# Patient Record
Sex: FEMALE | Race: WHITE | NOT HISPANIC OR LATINO | Employment: FULL TIME | URBAN - NONMETROPOLITAN AREA
[De-identification: names, ages, dates, MRNs, and addresses within clinical notes are randomized per-mention and may not be internally consistent; named-entity substitution may affect disease eponyms.]

---

## 2023-08-25 ENCOUNTER — OFFICE VISIT (OUTPATIENT)
Dept: URGENT CARE | Facility: PHYSICIAN GROUP | Age: 48
End: 2023-08-25

## 2023-08-25 VITALS
TEMPERATURE: 97.5 F | WEIGHT: 112 LBS | RESPIRATION RATE: 16 BRPM | OXYGEN SATURATION: 95 % | SYSTOLIC BLOOD PRESSURE: 130 MMHG | HEIGHT: 60 IN | HEART RATE: 96 BPM | DIASTOLIC BLOOD PRESSURE: 82 MMHG | BODY MASS INDEX: 21.99 KG/M2

## 2023-08-25 DIAGNOSIS — L50.9 URTICARIA: ICD-10-CM

## 2023-08-25 PROCEDURE — 3075F SYST BP GE 130 - 139MM HG: CPT | Performed by: FAMILY MEDICINE

## 2023-08-25 PROCEDURE — 99203 OFFICE O/P NEW LOW 30 MIN: CPT | Performed by: FAMILY MEDICINE

## 2023-08-25 PROCEDURE — 3079F DIAST BP 80-89 MM HG: CPT | Performed by: FAMILY MEDICINE

## 2023-08-25 RX ORDER — EZETIMIBE 10 MG/1
10 TABLET ORAL DAILY
COMMUNITY
Start: 2023-08-08

## 2023-08-25 RX ORDER — ATORVASTATIN CALCIUM 40 MG/1
40 TABLET, FILM COATED ORAL DAILY
COMMUNITY
Start: 2023-08-08

## 2023-08-25 RX ORDER — PREDNISONE 20 MG/1
TABLET ORAL
Qty: 10 TABLET | Refills: 0 | Status: SHIPPED | OUTPATIENT
Start: 2023-08-25

## 2023-08-25 RX ORDER — TRIAMCINOLONE ACETONIDE 40 MG/ML
40 INJECTION, SUSPENSION INTRA-ARTICULAR; INTRAMUSCULAR ONCE
Status: COMPLETED | OUTPATIENT
Start: 2023-08-25 | End: 2023-08-25

## 2023-08-25 RX ADMIN — TRIAMCINOLONE ACETONIDE 40 MG: 40 INJECTION, SUSPENSION INTRA-ARTICULAR; INTRAMUSCULAR at 10:19

## 2023-08-25 NOTE — PROGRESS NOTES
Chief Complaint:    Chief Complaint   Patient presents with    Rash     X 3 days both armpits and lower abd. X 3 days         History of Present Illness:    Itchy rash in bilateral axillae and trunk (R>L) and a little bit on lower abdomen x 3 days. Not on face, back, butt, legs, or hands. No new exposures she can think of. Tried OTC Benadryl and Hydrocortisone cream without help.      Past Medical History:    Past Medical History:   Diagnosis Date    DDD (degenerative disc disease)     High cholesterol     Mitral valve regurgitation      Past Surgical History:    History reviewed. No pertinent surgical history.    Social History:    Social History     Socioeconomic History    Marital status:      Spouse name: Not on file    Number of children: Not on file    Years of education: Not on file    Highest education level: Not on file   Occupational History    Not on file   Tobacco Use    Smoking status: Every Day     Current packs/day: 0.50     Types: Cigarettes    Smokeless tobacco: Not on file   Substance and Sexual Activity    Alcohol use: No    Drug use: No    Sexual activity: Yes     Partners: Male   Other Topics Concern    Not on file   Social History Narrative    Not on file     Social Determinants of Health     Financial Resource Strain: Not on file   Food Insecurity: Not on file   Transportation Needs: Not on file   Physical Activity: Not on file   Stress: Not on file   Social Connections: Not on file   Intimate Partner Violence: Not on file   Housing Stability: Not on file     Family History:    History reviewed. No pertinent family history.    Medications:    Current Outpatient Medications on File Prior to Visit   Medication Sig Dispense Refill    atorvastatin (LIPITOR) 40 MG Tab Take 40 mg by mouth every day.      ezetimibe (ZETIA) 10 MG Tab Take 10 mg by mouth every day.      oxycodone-acetaminophen (PERCOCET) 5-325 MG TABS Take 1-2 Tabs by mouth every 6 hours as needed (moderate to severe pain). 15  Tab 0    ibuprofen (MOTRIN) 600 MG TABS Take 1 Tab by mouth every 6 hours as needed for Mild Pain. 30 Tab 0     No current facility-administered medications on file prior to visit.     Allergies:    No Known Allergies      Vitals:    Vitals:    23 0950   BP: 130/82   Pulse: 96   Resp: 16   Temp: 36.4 °C (97.5 °F)   TempSrc: Temporal   SpO2: 95%   Weight: 50.8 kg (112 lb)   Height: 1.524 m (5')       Physical Exam:    Constitutional: Vital signs reviewed. Appears well-developed and well-nourished. No acute distress.   Eyes: Sclera white, conjunctivae clear.  ENT: External ears normal. Hearing normal.   Neck: Neck supple.   Pulmonary/Chest: Respirations non-labored.   Musculoskeletal: Normal gait. Normal range of motion. No tenderness to palpation. No muscular atrophy or weakness.  Neurological: Alert and oriented to person, place, and time. Muscle tone normal. Coordination normal.   Skin: Diffuse erythematous bumps, looks urticarial in nature on bilateral axillae and trunk (R>L) and few on lower abdomen.  Psychiatric: Normal mood and affect. Behavior is normal. Judgment and thought content normal.       Assessment / Plan & Medical Decision Makin. Urticaria  - triamcinolone acetonide (Kenalog-40) injection 40 mg  - predniSONE (DELTASONE) 20 MG Tab; 1 TAB BY MOUTH ONCE A DAY ONLY IF NEEDED FOR RASH AND/OR ITCHING. TAKE WITH FOOD.  Dispense: 10 Tablet; Refill: 0       Discussed with her DDX, management options, and risks, benefits, and alternatives to treatment plan agreed upon.    Itchy rash in bilateral axillae and trunk (R>L) and a little bit on lower abdomen x 3 days. Not on face, back, butt, legs, or hands. No new exposures she can think of. Tried OTC Benadryl and Hydrocortisone cream without help.    Diffuse erythematous bumps, looks urticarial in nature on bilateral axillae and trunk (R>L) and few on lower abdomen.    May use OTC antihistamine as needed.    She desires aggressive treatment due to  severity of symptoms.    Agreeable to medications given and prescribed.    Discussed expected course of duration, time for improvement, and to seek follow-up in Emergency Room, urgent care, or with PCP if getting worse at any time or not improving within expected time frame.

## 2023-12-05 ENCOUNTER — OFFICE VISIT (OUTPATIENT)
Dept: URGENT CARE | Facility: PHYSICIAN GROUP | Age: 48
End: 2023-12-05
Payer: COMMERCIAL

## 2023-12-05 VITALS
BODY MASS INDEX: 23.56 KG/M2 | SYSTOLIC BLOOD PRESSURE: 132 MMHG | TEMPERATURE: 96.9 F | WEIGHT: 120 LBS | HEART RATE: 92 BPM | RESPIRATION RATE: 18 BRPM | OXYGEN SATURATION: 98 % | DIASTOLIC BLOOD PRESSURE: 74 MMHG | HEIGHT: 60 IN

## 2023-12-05 DIAGNOSIS — R10.11 RUQ PAIN: ICD-10-CM

## 2023-12-05 PROCEDURE — 3075F SYST BP GE 130 - 139MM HG: CPT | Performed by: FAMILY MEDICINE

## 2023-12-05 PROCEDURE — 99214 OFFICE O/P EST MOD 30 MIN: CPT | Performed by: FAMILY MEDICINE

## 2023-12-05 PROCEDURE — 3078F DIAST BP <80 MM HG: CPT | Performed by: FAMILY MEDICINE

## 2023-12-05 RX ORDER — KETOROLAC TROMETHAMINE 30 MG/ML
30 INJECTION, SOLUTION INTRAMUSCULAR; INTRAVENOUS ONCE
Status: COMPLETED | OUTPATIENT
Start: 2023-12-05 | End: 2023-12-05

## 2023-12-05 RX ORDER — HYDROCODONE BITARTRATE AND ACETAMINOPHEN 5; 325 MG/1; MG/1
1 TABLET ORAL EVERY 8 HOURS PRN
Qty: 5 TABLET | Refills: 0 | Status: SHIPPED | OUTPATIENT
Start: 2023-12-05 | End: 2023-12-12

## 2023-12-05 RX ADMIN — KETOROLAC TROMETHAMINE 30 MG: 30 INJECTION, SOLUTION INTRAMUSCULAR; INTRAVENOUS at 11:02

## 2023-12-05 NOTE — PROGRESS NOTES
Subjective:     Chief Complaint   Patient presents with    Abdominal Pain     RUQ pain hurts tender to touch. X 4 days poss constipation, no eating well.                   Abdominal Pain  This is a new problem. The current episode started 4 d ago. The onset quality is sudden. The problem occurs constantly. The pain is unchanged. The pain is located in the periumbilical region. The pain is mild. The quality of the pain is described as aching. The pain does not radiate. Associated symptoms include headaches. Pertinent negatives include no belching, constipation, diarrhea, dysuria, fever, hematochezia, hematuria, nausea or sore throat. Nothing relieves the symptoms. Past treatments include nothing.     Social History     Tobacco Use    Smoking status: Every Day     Current packs/day: 0.50     Types: Cigarettes   Substance Use Topics    Alcohol use: No    Drug use: No           Current Outpatient Medications on File Prior to Visit   Medication Sig Dispense Refill    atorvastatin (LIPITOR) 40 MG Tab Take 40 mg by mouth every day.      ezetimibe (ZETIA) 10 MG Tab Take 10 mg by mouth every day.      predniSONE (DELTASONE) 20 MG Tab 1 TAB BY MOUTH ONCE A DAY ONLY IF NEEDED FOR RASH AND/OR ITCHING. TAKE WITH FOOD. 10 Tablet 0    oxycodone-acetaminophen (PERCOCET) 5-325 MG TABS Take 1-2 Tabs by mouth every 6 hours as needed (moderate to severe pain). 15 Tab 0    ibuprofen (MOTRIN) 600 MG TABS Take 1 Tab by mouth every 6 hours as needed for Mild Pain. 30 Tab 0     No current facility-administered medications on file prior to visit.       No family history on file.      No Known Allergies      Review of Systems   Constitutional: Negative for fever.   HENT: Negative for sore throat.    Gastrointestinal: Positive for abdominal pain. Negative for nausea, diarrhea, constipation and hematochezia.   Genitourinary: Negative for dysuria and hematuria.   Neurological: denies dizziness, confusion, disorientation.   No extremity weakness  or numbness  All other systems reviewed and are negative.         Objective:     /74   Pulse 92   Temp 36.1 °C (96.9 °F) (Temporal)   Resp 18   Ht 1.524 m (5')   Wt 54.4 kg (120 lb)   SpO2 98%       Physical Exam   Constitutional: pt appears well-developed. No distress.   HENT:   Nose: No nasal discharge.   Mouth/Throat: Mucous membranes are moist. Oropharynx is clear.   Eyes: Conjunctivae and EOM are normal. Pupils are equal, round, and reactive to light. Right eye exhibits no discharge. Left eye exhibits no discharge.   Neck: Neck supple.   Cardiovascular: Normal rate, regular rhythm, S1 normal and S2 normal.    Pulmonary/Chest: Effort normal and breath sounds normal. There is normal air entry. No respiratory distress.   Abdominal: Soft. There is tenderness in the periumbilical, RUQ area. bowel sounds are present.   No liver or spleen enlargement .  No rebound and no guarding.   There is no pain over McBurney's point  Lymphadenopathy:     Pt has no  adenopathy.   Neurological: pt is alert and orientated x3 . No cranial nerve deficit.   Skin: Skin is warm and moist. No petechiae and no rash noted.   not diaphoretic. No jaundice.   Nursing note and vitals reviewed.              Assessment/Plan:          1. RUQ pain     Exact etiology unclear, but sx and physical exam suggest  gallstones    - HYDROcodone-acetaminophen (NORCO) 5-325 MG Tab per tablet; Take 1 Tablet by mouth every 8 hours as needed (pain) for up to 7 days.  Dispense: 5 Tablet; Refill: 0  - US-RUQ; Future  - ketorolac (Toradol) injection 30 mg    U/s scheduled for 12/7 at 0800      Differential diagnosis, natural history, supportive care, and indications for immediate follow-up discussed. All questions answered. Patient agrees with the plan of care.     Follow-up as needed if symptoms worsen or fail to improve to PCP, Urgent care or Emergency Room.     I have personally reviewed prior external notes and test results pertinent to today's  visit.  I have independently reviewed and interpreted all diagnostics ordered during this urgent care acute visit.

## 2023-12-05 NOTE — LETTER
December 5, 2023         Patient: Ling Granda   YOB: 1975   Date of Visit: 12/5/2023           To Whom it May Concern:    Ling Granda was seen in my clinic on 12/5/2023. She may return to work on 12/11/2023.    If you have any questions or concerns, please don't hesitate to call.        Sincerely,           Alber Montejo M.D.  Electronically Signed

## 2023-12-07 ENCOUNTER — HOSPITAL ENCOUNTER (OUTPATIENT)
Dept: RADIOLOGY | Facility: MEDICAL CENTER | Age: 48
End: 2023-12-07
Attending: FAMILY MEDICINE
Payer: COMMERCIAL

## 2023-12-07 ENCOUNTER — HOSPITAL ENCOUNTER (EMERGENCY)
Facility: MEDICAL CENTER | Age: 48
End: 2023-12-07
Payer: COMMERCIAL

## 2023-12-07 ENCOUNTER — PATIENT MESSAGE (OUTPATIENT)
Dept: URGENT CARE | Facility: PHYSICIAN GROUP | Age: 48
End: 2023-12-07
Payer: COMMERCIAL

## 2023-12-07 DIAGNOSIS — R10.11 RUQ PAIN: ICD-10-CM

## 2023-12-07 PROCEDURE — 700117 HCHG RX CONTRAST REV CODE 255: Performed by: FAMILY MEDICINE

## 2023-12-07 PROCEDURE — 76705 ECHO EXAM OF ABDOMEN: CPT

## 2023-12-07 PROCEDURE — 74160 CT ABDOMEN W/CONTRAST: CPT

## 2023-12-07 RX ORDER — OXYCODONE AND ACETAMINOPHEN 10; 325 MG/1; MG/1
1 TABLET ORAL EVERY 4 HOURS PRN
Qty: 10 TABLET | Refills: 0 | Status: SHIPPED | OUTPATIENT
Start: 2023-12-07 | End: 2023-12-14

## 2023-12-07 RX ADMIN — IOHEXOL 100 ML: 350 INJECTION, SOLUTION INTRAVENOUS at 14:51

## 2023-12-07 NOTE — RESULT ENCOUNTER NOTE
Please call pt:      CT scan ws basically unremarkable.    There was no indication of inflammation or infection.    There was a small spot on her liver, but this is likely just a benign cyst, so she should f/u with PCP for that.

## 2023-12-08 NOTE — PROGRESS NOTES
She continues to have abd pain  despite taking norco.   Pt reports abd pain 10 out of 10          Plan:      Transfer to ED     Pt voiced understanding

## 2023-12-08 NOTE — PROGRESS NOTES
Pt is still having 10 out of 10 abd pain    CT unremarkable.     U/s shows no gallstones.       Plan:      Pt advised to go to ED     Pt voiced understanding    Report called to tx line

## 2024-04-30 ENCOUNTER — HOSPITAL ENCOUNTER (OUTPATIENT)
Facility: MEDICAL CENTER | Age: 49
End: 2024-04-30
Attending: FAMILY MEDICINE
Payer: COMMERCIAL

## 2024-04-30 ENCOUNTER — OFFICE VISIT (OUTPATIENT)
Dept: URGENT CARE | Facility: PHYSICIAN GROUP | Age: 49
End: 2024-04-30
Payer: COMMERCIAL

## 2024-04-30 VITALS
WEIGHT: 121 LBS | HEART RATE: 106 BPM | RESPIRATION RATE: 18 BRPM | OXYGEN SATURATION: 99 % | SYSTOLIC BLOOD PRESSURE: 122 MMHG | DIASTOLIC BLOOD PRESSURE: 64 MMHG | HEIGHT: 60 IN | BODY MASS INDEX: 23.75 KG/M2 | TEMPERATURE: 98.1 F

## 2024-04-30 DIAGNOSIS — R10.11 RUQ PAIN: ICD-10-CM

## 2024-04-30 DIAGNOSIS — R31.9 HEMATURIA, UNSPECIFIED TYPE: ICD-10-CM

## 2024-04-30 DIAGNOSIS — R10.9 ABDOMINAL PAIN, UNSPECIFIED ABDOMINAL LOCATION: ICD-10-CM

## 2024-04-30 DIAGNOSIS — R10.2 PELVIC PAIN: ICD-10-CM

## 2024-04-30 LAB
APPEARANCE UR: CLEAR
BILIRUB UR STRIP-MCNC: NORMAL MG/DL
COLOR UR AUTO: NORMAL
GLUCOSE UR STRIP.AUTO-MCNC: NORMAL MG/DL
KETONES UR STRIP.AUTO-MCNC: NORMAL MG/DL
LEUKOCYTE ESTERASE UR QL STRIP.AUTO: NORMAL
NITRITE UR QL STRIP.AUTO: NORMAL
PH UR STRIP.AUTO: 6 [PH] (ref 5–8)
PROT UR QL STRIP: NORMAL MG/DL
RBC UR QL AUTO: NORMAL
SP GR UR STRIP.AUTO: <=1.005
UROBILINOGEN UR STRIP-MCNC: 0.2 MG/DL

## 2024-04-30 PROCEDURE — 3074F SYST BP LT 130 MM HG: CPT | Performed by: FAMILY MEDICINE

## 2024-04-30 PROCEDURE — 81002 URINALYSIS NONAUTO W/O SCOPE: CPT | Performed by: FAMILY MEDICINE

## 2024-04-30 PROCEDURE — 99214 OFFICE O/P EST MOD 30 MIN: CPT | Performed by: FAMILY MEDICINE

## 2024-04-30 PROCEDURE — 3078F DIAST BP <80 MM HG: CPT | Performed by: FAMILY MEDICINE

## 2024-04-30 RX ORDER — OXYCODONE AND ACETAMINOPHEN 10; 325 MG/1; MG/1
1-2 TABLET ORAL EVERY 8 HOURS PRN
Qty: 10 TABLET | Refills: 0 | Status: SHIPPED | OUTPATIENT
Start: 2024-04-30 | End: 2024-05-07

## 2024-04-30 ASSESSMENT — PAIN SCALES - GENERAL: PAINLEVEL: 10=SEVERE PAIN

## 2024-04-30 NOTE — PROGRESS NOTES
Subjective:     Chief Complaint   Patient presents with    Abdominal Pain     Abd/flank pain that she has been dealing with since December, referred to GI originally. No diagnosis yet. Pain getting worse again                   Abdominal Pain  This is a new problem. The current episode started today. The onset quality is sudden. The problem occurs constantly. The pain is unchanged. The pain is located in the periumbilical region. The pain is mild. The quality of the pain is described as aching. The pain does not radiate. Associated symptoms include headaches. Pertinent negatives include no belching, constipation, diarrhea, dysuria, fever, hematochezia, hematuria, nausea or sore throat. Nothing relieves the symptoms. Past treatments include nothing.     Social History     Tobacco Use    Smoking status: Former     Current packs/day: 0.50     Types: Cigarettes   Vaping Use    Vaping Use: Never used   Substance Use Topics    Alcohol use: No    Drug use: No           Current Outpatient Medications on File Prior to Visit   Medication Sig Dispense Refill    atorvastatin (LIPITOR) 40 MG Tab Take 40 mg by mouth every day.      ezetimibe (ZETIA) 10 MG Tab Take 10 mg by mouth every day.      predniSONE (DELTASONE) 20 MG Tab 1 TAB BY MOUTH ONCE A DAY ONLY IF NEEDED FOR RASH AND/OR ITCHING. TAKE WITH FOOD. (Patient not taking: Reported on 4/30/2024) 10 Tablet 0    oxycodone-acetaminophen (PERCOCET) 5-325 MG TABS Take 1-2 Tabs by mouth every 6 hours as needed (moderate to severe pain). (Patient not taking: Reported on 4/30/2024) 15 Tab 0    ibuprofen (MOTRIN) 600 MG TABS Take 1 Tab by mouth every 6 hours as needed for Mild Pain. (Patient not taking: Reported on 4/30/2024) 30 Tab 0     No current facility-administered medications on file prior to visit.       History reviewed. No pertinent family history.      No Known Allergies      Review of Systems   Constitutional: Negative for fever.   HENT: Negative for sore throat.     Gastrointestinal: Positive for abdominal pain. Negative for nausea, diarrhea, constipation and hematochezia.   Genitourinary: Negative for dysuria and hematuria.   Neurological: denies dizziness, confusion, disorientation.   No extremity weakness or numbness  All other systems reviewed and are negative.         Objective:     /64   Pulse (!) 106   Temp 36.7 °C (98.1 °F) (Temporal)   Resp 18   Ht 1.524 m (5')   Wt 54.9 kg (121 lb)   SpO2 99%       Physical Exam   Constitutional: pt appears well-developed. No distress.   HENT:   Nose: No nasal discharge.   Mouth/Throat: Mucous membranes are moist. Oropharynx is clear.   Eyes: Conjunctivae and EOM are normal. Pupils are equal, round, and reactive to light. Right eye exhibits no discharge. Left eye exhibits no discharge.   Neck: Neck supple.   Cardiovascular: Normal rate, regular rhythm, S1 normal and S2 normal.    Pulmonary/Chest: Effort normal and breath sounds normal. There is normal air entry. No respiratory distress.   Abdominal: Soft. There is tenderness in the periumbilical area. bowel sounds are present.   No liver or spleen enlargement .  No rebound and no guarding.   There is no pain over McBurney's point  Lymphadenopathy:     Pt has no  adenopathy.   Neurological: pt is alert and orientated x3 . No cranial nerve deficit.   Skin: Skin is warm and moist. No petechiae and no rash noted.   not diaphoretic. No jaundice.   Nursing note and vitals reviewed.              Assessment/Plan:          1. Abdominal pain, unspecified abdominal location  ***  - CT-ABDOMEN-PELVIS WITH; Future  - POCT Urinalysis  - URINE CULTURE(NEW); Future  - oxyCODONE-acetaminophen (PERCOCET-10)  MG Tab; Take 1-2 Tablets by mouth every 8 hours as needed for Severe Pain for up to 7 days.  Dispense: 10 Tablet; Refill: 0    2. Pelvic pain  ***  - CT-ABDOMEN-PELVIS WITH; Future  - POCT Urinalysis  - URINE CULTURE(NEW); Future    3. RUQ pain  ***  - POCT Urinalysis  - URINE  CULTURE(NEW); Future       Protein 04/30/2024 neg  Negative mg/dL Final    POC Urobiligen 04/30/2024 0.2  Negative (0.2) mg/dL Final    POC Nitrites 04/30/2024 neg  Negative Final    POC Leukocyte Esterase 04/30/2024 neg  Negative Final          Assessment/Plan:          1. Abdominal pain, unspecified abdominal location     UA unremarkable  Urine sent for cx  CT personally reviewed and it was unremarkable.    Unclear cause of her pain    Workup with GI was negative      Referred  to pain mgmt      - oxyCODONE-acetaminophen (PERCOCET-10)  MG Tab; Take 1-2 Tablets by mouth every 8 hours as needed for Severe Pain for up to 7 days.  Dispense: 10 Tablet; Refill: 0      Narcotic use report obtained with no indication of narcotic overuse or misuse and deemed necessary for treaatment. Sedation, dependence, and constipation precautions given. Avoid use while driving or operating heavy machinery.           Differential diagnosis, natural history, supportive care, and indications for immediate follow-up discussed. All questions answered. Patient agrees with the plan of care.     Follow-up as needed if symptoms worsen or fail to improve to PCP, Urgent care or Emergency Room.     I have personally reviewed prior external notes and test results pertinent to today's visit.  I have independently reviewed and interpreted all diagnostics ordered during this urgent care acute visit.

## 2024-04-30 NOTE — LETTER
April 30, 2024         Patient: Ling Granda   YOB: 1975   Date of Visit: 4/30/2024           To Whom it May Concern:    Ling Granda was seen in my clinic on 4/30/2024.       Please excuse absence due to illness for yesterday and today.     If you have any questions or concerns, please don't hesitate to call.        Sincerely,           Alber Montejo M.D.  Electronically Signed

## 2024-05-01 ENCOUNTER — HOSPITAL ENCOUNTER (OUTPATIENT)
Dept: RADIOLOGY | Facility: MEDICAL CENTER | Age: 49
End: 2024-05-01
Attending: FAMILY MEDICINE
Payer: COMMERCIAL

## 2024-05-01 DIAGNOSIS — R10.9 ABDOMINAL PAIN, UNSPECIFIED ABDOMINAL LOCATION: ICD-10-CM

## 2024-05-01 DIAGNOSIS — R10.2 PELVIC PAIN: ICD-10-CM

## 2024-05-01 RX ADMIN — IOHEXOL 100 ML: 350 INJECTION, SOLUTION INTRAVENOUS at 15:58

## 2024-05-01 RX ADMIN — IOHEXOL 25 ML: 240 INJECTION, SOLUTION INTRATHECAL; INTRAVASCULAR; INTRAVENOUS; ORAL at 15:00

## 2024-05-03 DIAGNOSIS — G89.29 CHRONIC ABDOMINAL PAIN: ICD-10-CM

## 2024-05-03 DIAGNOSIS — R10.9 CHRONIC ABDOMINAL PAIN: ICD-10-CM

## 2024-05-03 LAB
BACTERIA UR CULT: NORMAL
SIGNIFICANT IND 70042: NORMAL
SITE SITE: NORMAL
SOURCE SOURCE: NORMAL

## 2024-05-04 DIAGNOSIS — R31.9 HEMATURIA, UNSPECIFIED TYPE: ICD-10-CM

## 2024-07-29 ENCOUNTER — APPOINTMENT (OUTPATIENT)
Dept: URGENT CARE | Facility: PHYSICIAN GROUP | Age: 49
End: 2024-07-29
Payer: COMMERCIAL

## 2024-08-22 ENCOUNTER — OFFICE VISIT (OUTPATIENT)
Dept: URGENT CARE | Facility: PHYSICIAN GROUP | Age: 49
End: 2024-08-22
Payer: COMMERCIAL

## 2024-08-22 VITALS
OXYGEN SATURATION: 98 % | TEMPERATURE: 98 F | WEIGHT: 123 LBS | SYSTOLIC BLOOD PRESSURE: 124 MMHG | BODY MASS INDEX: 24.15 KG/M2 | RESPIRATION RATE: 16 BRPM | HEIGHT: 60 IN | HEART RATE: 97 BPM | DIASTOLIC BLOOD PRESSURE: 76 MMHG

## 2024-08-22 DIAGNOSIS — R10.11 RIGHT UPPER QUADRANT ABDOMINAL PAIN: ICD-10-CM

## 2024-08-22 DIAGNOSIS — Z84.2 FAMILY HISTORY OF ENDOMETRIOSIS: ICD-10-CM

## 2024-08-22 PROCEDURE — 99214 OFFICE O/P EST MOD 30 MIN: CPT | Performed by: FAMILY MEDICINE

## 2024-08-22 PROCEDURE — 3074F SYST BP LT 130 MM HG: CPT | Performed by: FAMILY MEDICINE

## 2024-08-22 PROCEDURE — 3078F DIAST BP <80 MM HG: CPT | Performed by: FAMILY MEDICINE

## 2024-08-22 RX ORDER — OXYCODONE AND ACETAMINOPHEN 10; 325 MG/1; MG/1
1 TABLET ORAL EVERY 8 HOURS PRN
Qty: 10 TABLET | Refills: 0 | Status: SHIPPED | OUTPATIENT
Start: 2024-08-22 | End: 2024-08-29

## 2024-08-23 ENCOUNTER — HOSPITAL ENCOUNTER (OUTPATIENT)
Dept: RADIOLOGY | Facility: MEDICAL CENTER | Age: 49
End: 2024-08-23
Attending: FAMILY MEDICINE
Payer: COMMERCIAL

## 2024-08-23 DIAGNOSIS — Z84.2 FAMILY HISTORY OF ENDOMETRIOSIS: ICD-10-CM

## 2024-08-23 DIAGNOSIS — R10.11 RIGHT UPPER QUADRANT ABDOMINAL PAIN: ICD-10-CM

## 2024-08-23 PROCEDURE — 76830 TRANSVAGINAL US NON-OB: CPT

## 2024-08-23 NOTE — PROGRESS NOTES
Chief Complaint   Patient presents with    LUQ Pain     X 4 months.  Pt. Was seen here a few times for this.      Subjective:     Chief Complaint   Patient presents with    LUQ Pain     X 4 months.  Pt. Was seen here a few times for this.                  Abdominal Pain  This is a CHRONIC  problem. The current episode started several months ago.      C/o worsening left abd/pelvic pain.       Mom has endometriosis.          Pertinent negatives include no belching, constipation, diarrhea, dysuria, fever, hematochezia, hematuria, nausea or sore throat.      Social History     Tobacco Use    Smoking status: Former     Current packs/day: 0.50     Types: Cigarettes   Vaping Use    Vaping status: Never Used   Substance Use Topics    Alcohol use: No    Drug use: No           Current Outpatient Medications on File Prior to Visit   Medication Sig Dispense Refill    atorvastatin (LIPITOR) 40 MG Tab Take 40 mg by mouth every day. (Patient not taking: Reported on 8/22/2024)      ezetimibe (ZETIA) 10 MG Tab Take 10 mg by mouth every day. (Patient not taking: Reported on 8/22/2024)      predniSONE (DELTASONE) 20 MG Tab 1 TAB BY MOUTH ONCE A DAY ONLY IF NEEDED FOR RASH AND/OR ITCHING. TAKE WITH FOOD. (Patient not taking: Reported on 4/30/2024) 10 Tablet 0    oxycodone-acetaminophen (PERCOCET) 5-325 MG TABS Take 1-2 Tabs by mouth every 6 hours as needed (moderate to severe pain). (Patient not taking: Reported on 4/30/2024) 15 Tab 0    ibuprofen (MOTRIN) 600 MG TABS Take 1 Tab by mouth every 6 hours as needed for Mild Pain. (Patient not taking: Reported on 4/30/2024) 30 Tab 0     No current facility-administered medications on file prior to visit.       No family history on file.      No Known Allergies      Review of Systems   Constitutional: Negative for fever.   HENT: Negative for sore throat.    Gastrointestinal: Positive for abdominal pain. Negative for nausea, diarrhea, constipation and hematochezia.   Genitourinary: Negative  for dysuria and hematuria.   Neurological: denies dizziness, confusion, disorientation.   No extremity weakness or numbness  All other systems reviewed and are negative.         Objective:     /76   Pulse 97   Temp 36.7 °C (98 °F) (Temporal)   Resp 16   Ht 1.524 m (5')   Wt 55.8 kg (123 lb)   SpO2 98%       Physical Exam   Constitutional: pt appears well-developed. No distress.   HENT:   Nose: No nasal discharge.   Mouth/Throat: Mucous membranes are moist. Oropharynx is clear.   Eyes: Conjunctivae and EOM are normal. Pupils are equal, round, and reactive to light. Right eye exhibits no discharge. Left eye exhibits no discharge.   Neck: Neck supple.   Cardiovascular: Normal rate, regular rhythm, S1 normal and S2 normal.    Pulmonary/Chest: Effort normal and breath sounds normal. There is normal air entry. No respiratory distress.   Abdominal: Soft. There is tenderness in the LLQ area. bowel sounds are present.   No liver or spleen enlargement .  No rebound and no guarding.   There is no pain over McBurney's point  Lymphadenopathy:     Pt has no  adenopathy.   Neurological: pt is alert and orientated x3 . No cranial nerve deficit.   Skin: Skin is warm and moist. No petechiae and no rash noted.   not diaphoretic. No jaundice.   Nursing note and vitals reviewed.         Details    Reading Physician Reading Date Result Priority   Rafael Saleem M.D.  [phone] 8/23/2024      Narrative & Impression     8/23/2024 4:25 PM     HISTORY/REASON FOR EXAM:  Pain; [id number]        TECHNIQUE/EXAM DESCRIPTION:  Transabdominal and transvaginal pelvic ultrasound.     COMPARISON:   CT abdomen pelvis 5/1/2024     FINDINGS:  Both transabdominal and transvaginal scanning were performed to optimally visualize the pelvis.     UTERUS:  The uterus measures 8.2 x 4.0 x 3.2 cm.  The uterine myometrium is within normal limits.  The endometrial echo complex measures 0.2 cm.  The endometrium is unremarkable in appearance and  thickness for age and menstrual status.        OVARIES:  The right ovary measures 3.1 x 1.4 x 1.2 cm. Duplex Doppler examination of the right ovary shows normal waveforms.     The left ovary is not visualized due to overlying bowel gas.           There is no free fluid seen.     IMPRESSION:     1.  Left ovary is not visualized due to overlying bowel gas.  2.  Otherwise unremarkable pelvic ultrasound.           Exam Ended: 08/23/24  4:57 PM Last Resulted: 08/23/24  5:10 PM             Assessment/Plan:         1. Right sided  pelvic, abdominal pain   U/s unremarkable, but unable to visualize rt ovary due to overlying bowel gas.    RT ovary visualized on CT done May 1 and was unremarkable.       - oxyCODONE-acetaminophen (PERCOCET-10)  MG Tab; Take 1 Tablet by mouth every 8 hours as needed for Severe Pain for up to 7 days.  Dispense: 10 Tablet; Refill: 0  - US-PELVIC COMPLETE (TRANSABDOMINAL/TRANSVAGINAL) (COMBO); Future    2. Family history of endometriosis   While the ultrasound argues against endometriosis, it does not completely rule it out.    Given the paucity of positive findings so far, I feel that it is still worth considering as a diagnosis, so urgent referral placed to gyn      - Referral to Gynecology  - US-PELVIC COMPLETE (TRANSABDOMINAL/TRANSVAGINAL) (COMBO); Future        Narcotic use report obtained with no indication of narcotic overuse or misuse and deemed necessary for treaatment. Sedation, dependence, and constipation precautions given. Avoid use while driving or operating heavy machinery.         Differential diagnosis, natural history, supportive care, and indications for immediate follow-up discussed. All questions answered. Patient agrees with the plan of care.     Follow-up as needed if symptoms worsen or fail to improve to PCP, Urgent care or Emergency Room.     I have personally reviewed prior external notes and test results pertinent to today's visit.  I have independently reviewed and  interpreted all diagnostics ordered during this urgent care acute visit.

## 2025-01-13 ENCOUNTER — APPOINTMENT (OUTPATIENT)
Dept: URGENT CARE | Facility: PHYSICIAN GROUP | Age: 50
End: 2025-01-13
Payer: COMMERCIAL

## 2025-01-14 ENCOUNTER — HOSPITAL ENCOUNTER (OUTPATIENT)
Dept: LAB | Facility: MEDICAL CENTER | Age: 50
End: 2025-01-14
Attending: NURSE PRACTITIONER
Payer: COMMERCIAL

## 2025-01-14 ENCOUNTER — OFFICE VISIT (OUTPATIENT)
Dept: URGENT CARE | Facility: PHYSICIAN GROUP | Age: 50
End: 2025-01-14
Payer: COMMERCIAL

## 2025-01-14 ENCOUNTER — APPOINTMENT (OUTPATIENT)
Dept: URGENT CARE | Facility: PHYSICIAN GROUP | Age: 50
End: 2025-01-14
Payer: COMMERCIAL

## 2025-01-14 VITALS
TEMPERATURE: 97.5 F | HEIGHT: 60 IN | SYSTOLIC BLOOD PRESSURE: 126 MMHG | WEIGHT: 123 LBS | RESPIRATION RATE: 18 BRPM | BODY MASS INDEX: 24.15 KG/M2 | HEART RATE: 123 BPM | OXYGEN SATURATION: 98 % | DIASTOLIC BLOOD PRESSURE: 64 MMHG

## 2025-01-14 DIAGNOSIS — R10.9 LEFT FLANK PAIN: ICD-10-CM

## 2025-01-14 DIAGNOSIS — R10.9 ABDOMINAL PAIN, UNSPECIFIED ABDOMINAL LOCATION: ICD-10-CM

## 2025-01-14 LAB
ALBUMIN SERPL BCP-MCNC: 4.9 G/DL (ref 3.2–4.9)
ALBUMIN/GLOB SERPL: 1.8 G/DL
ALP SERPL-CCNC: 47 U/L (ref 30–99)
ALT SERPL-CCNC: 15 U/L (ref 2–50)
ANION GAP SERPL CALC-SCNC: 13 MMOL/L (ref 7–16)
APPEARANCE UR: CLEAR
AST SERPL-CCNC: 17 U/L (ref 12–45)
BASOPHILS # BLD AUTO: 0.6 % (ref 0–1.8)
BASOPHILS # BLD: 0.04 K/UL (ref 0–0.12)
BILIRUB SERPL-MCNC: 0.5 MG/DL (ref 0.1–1.5)
BILIRUB UR STRIP-MCNC: NORMAL MG/DL
BUN SERPL-MCNC: 10 MG/DL (ref 8–22)
CALCIUM ALBUM COR SERPL-MCNC: 8.4 MG/DL (ref 8.5–10.5)
CALCIUM SERPL-MCNC: 9.1 MG/DL (ref 8.5–10.5)
CHLORIDE SERPL-SCNC: 102 MMOL/L (ref 96–112)
CO2 SERPL-SCNC: 24 MMOL/L (ref 20–33)
COLOR UR AUTO: YELLOW
CREAT SERPL-MCNC: 0.73 MG/DL (ref 0.5–1.4)
EOSINOPHIL # BLD AUTO: 0.02 K/UL (ref 0–0.51)
EOSINOPHIL NFR BLD: 0.3 % (ref 0–6.9)
ERYTHROCYTE [DISTWIDTH] IN BLOOD BY AUTOMATED COUNT: 41.6 FL (ref 35.9–50)
GFR SERPLBLD CREATININE-BSD FMLA CKD-EPI: 101 ML/MIN/1.73 M 2
GLOBULIN SER CALC-MCNC: 2.8 G/DL (ref 1.9–3.5)
GLUCOSE SERPL-MCNC: 120 MG/DL (ref 65–99)
GLUCOSE UR STRIP.AUTO-MCNC: NORMAL MG/DL
HCT VFR BLD AUTO: 42 % (ref 37–47)
HGB BLD-MCNC: 13.6 G/DL (ref 12–16)
IMM GRANULOCYTES # BLD AUTO: 0.01 K/UL (ref 0–0.11)
IMM GRANULOCYTES NFR BLD AUTO: 0.2 % (ref 0–0.9)
KETONES UR STRIP.AUTO-MCNC: NORMAL MG/DL
LEUKOCYTE ESTERASE UR QL STRIP.AUTO: NORMAL
LIPASE SERPL-CCNC: 36 U/L (ref 11–82)
LYMPHOCYTES # BLD AUTO: 1.06 K/UL (ref 1–4.8)
LYMPHOCYTES NFR BLD: 16.6 % (ref 22–41)
MCH RBC QN AUTO: 29.1 PG (ref 27–33)
MCHC RBC AUTO-ENTMCNC: 32.4 G/DL (ref 32.2–35.5)
MCV RBC AUTO: 89.9 FL (ref 81.4–97.8)
MONOCYTES # BLD AUTO: 0.4 K/UL (ref 0–0.85)
MONOCYTES NFR BLD AUTO: 6.3 % (ref 0–13.4)
NEUTROPHILS # BLD AUTO: 4.84 K/UL (ref 1.82–7.42)
NEUTROPHILS NFR BLD: 76 % (ref 44–72)
NITRITE UR QL STRIP.AUTO: NORMAL
NRBC # BLD AUTO: 0 K/UL
NRBC BLD-RTO: 0 /100 WBC (ref 0–0.2)
PH UR STRIP.AUTO: 5.5 [PH] (ref 5–8)
PLATELET # BLD AUTO: 373 K/UL (ref 164–446)
PMV BLD AUTO: 9.8 FL (ref 9–12.9)
POTASSIUM SERPL-SCNC: 3.9 MMOL/L (ref 3.6–5.5)
PROT SERPL-MCNC: 7.7 G/DL (ref 6–8.2)
PROT UR QL STRIP: NORMAL MG/DL
RBC # BLD AUTO: 4.67 M/UL (ref 4.2–5.4)
RBC UR QL AUTO: NORMAL
SODIUM SERPL-SCNC: 139 MMOL/L (ref 135–145)
SP GR UR STRIP.AUTO: >=1.03
UROBILINOGEN UR STRIP-MCNC: 0.2 MG/DL
WBC # BLD AUTO: 6.4 K/UL (ref 4.8–10.8)

## 2025-01-14 PROCEDURE — 85025 COMPLETE CBC W/AUTO DIFF WBC: CPT

## 2025-01-14 PROCEDURE — 81002 URINALYSIS NONAUTO W/O SCOPE: CPT | Performed by: NURSE PRACTITIONER

## 2025-01-14 PROCEDURE — 3078F DIAST BP <80 MM HG: CPT | Performed by: NURSE PRACTITIONER

## 2025-01-14 PROCEDURE — 99214 OFFICE O/P EST MOD 30 MIN: CPT | Performed by: NURSE PRACTITIONER

## 2025-01-14 PROCEDURE — 36415 COLL VENOUS BLD VENIPUNCTURE: CPT

## 2025-01-14 PROCEDURE — 3074F SYST BP LT 130 MM HG: CPT | Performed by: NURSE PRACTITIONER

## 2025-01-14 PROCEDURE — 83690 ASSAY OF LIPASE: CPT

## 2025-01-14 PROCEDURE — 80053 COMPREHEN METABOLIC PANEL: CPT

## 2025-01-14 RX ORDER — KETOROLAC TROMETHAMINE 15 MG/ML
15 INJECTION, SOLUTION INTRAMUSCULAR; INTRAVENOUS ONCE
Status: COMPLETED | OUTPATIENT
Start: 2025-01-14 | End: 2025-01-14

## 2025-01-14 RX ORDER — HYDROCODONE BITARTRATE AND ACETAMINOPHEN 5; 325 MG/1; MG/1
1 TABLET ORAL EVERY 4 HOURS PRN
Qty: 15 TABLET | Refills: 0 | Status: SHIPPED | OUTPATIENT
Start: 2025-01-14 | End: 2025-01-19

## 2025-01-14 RX ADMIN — KETOROLAC TROMETHAMINE 15 MG: 15 INJECTION, SOLUTION INTRAMUSCULAR; INTRAVENOUS at 13:35

## 2025-01-15 ASSESSMENT — ENCOUNTER SYMPTOMS: ABDOMINAL PAIN: 1

## 2025-01-15 NOTE — PROGRESS NOTES
Subjective:     Ling Granda is a 49 y.o. female who presents for Abdominal Pain (Left abd pain has been seen multiple times for same issue )      Abdominal Pain      Pt presents for evaluation of a new problem. Ling is a pleasant 49-year-old female who presents to urgent care today for reoccurring abdominal pain to her left side/flank.  This has been ongoing intermittently for the past 2 years and she has had extensive testing performed including lab work, EGD and CT with contrast.  Her pain started yesterday and she has been using over-the-counter Tylenol and ibuprofen for her symptoms.  She denies any nausea, vomiting, diarrhea or change in appetite.  Her pain is constant.  She rates her pain as a 10/10.  Her pain is described as stabbing.  She denies any dysuria, urgency, frequency, fever or chills.    Review of Systems   Gastrointestinal:  Positive for abdominal pain.       PMH:   Past Medical History:   Diagnosis Date    DDD (degenerative disc disease)     High cholesterol     Mitral valve regurgitation      ALLERGIES: No Known Allergies  SURGHX:   Past Surgical History:   Procedure Laterality Date    ENDOSCOPY SM INTEST W/BIOPSY  02/2024     SOCHX:   Social History     Socioeconomic History    Marital status: Single   Tobacco Use    Smoking status: Former     Current packs/day: 0.50     Types: Cigarettes   Vaping Use    Vaping status: Never Used   Substance and Sexual Activity    Alcohol use: No    Drug use: No    Sexual activity: Yes     Partners: Male     FH: No family history on file.      Objective:   /64   Pulse (!) 123   Temp 36.4 °C (97.5 °F) (Temporal)   Resp 18   Ht 1.524 m (5')   Wt 55.8 kg (123 lb)   SpO2 98%   BMI 24.02 kg/m²     Physical Exam  Vitals and nursing note reviewed.   Constitutional:       General: She is not in acute distress.     Appearance: Normal appearance. She is not ill-appearing.   HENT:      Head: Normocephalic and atraumatic.      Right Ear: External ear  normal.      Left Ear: External ear normal.      Nose: No congestion or rhinorrhea.      Mouth/Throat:      Mouth: Mucous membranes are moist.   Eyes:      Extraocular Movements: Extraocular movements intact.      Pupils: Pupils are equal, round, and reactive to light.   Cardiovascular:      Rate and Rhythm: Regular rhythm. Tachycardia present.      Pulses: Normal pulses.      Heart sounds: Normal heart sounds.   Pulmonary:      Effort: Pulmonary effort is normal.      Breath sounds: Normal breath sounds.   Abdominal:      General: Abdomen is flat. Bowel sounds are normal.      Palpations: Abdomen is soft.      Tenderness: There is abdominal tenderness. There is no right CVA tenderness or left CVA tenderness.       Musculoskeletal:         General: Normal range of motion.      Cervical back: Normal range of motion and neck supple.   Skin:     General: Skin is warm and dry.      Capillary Refill: Capillary refill takes less than 2 seconds.   Neurological:      General: No focal deficit present.      Mental Status: She is alert and oriented to person, place, and time. Mental status is at baseline.   Psychiatric:         Mood and Affect: Mood normal.         Behavior: Behavior normal.         Thought Content: Thought content normal.         Judgment: Judgment normal.       Results for orders placed or performed in visit on 01/14/25   POCT Urinalysis    Collection Time: 01/14/25  1:22 PM   Result Value Ref Range    POC Color yellow Negative    POC Appearance clear Negative    POC Glucose neg Negative mg/dL    POC Bilirubin neg Negative mg/dL    POC Ketones neg Negative mg/dL    POC Specific Gravity >=1.030 <1.005 - >1.030    POC Blood moderate Negative    POC Urine PH 5.5 5.0 - 8.0    POC Protein neg Negative mg/dL    POC Urobiligen 0.2 Negative (0.2) mg/dL    POC Nitrites neg Negative    POC Leukocyte Esterase neg Negative       Assessment/Plan:   Assessment    1. Abdominal pain, unspecified abdominal location  POCT  Urinalysis    CT-RENAL COLIC EVALUATION(A/P W/O)    ketorolac (Toradol) 15 MG/ML injection 15 mg    HYDROcodone-acetaminophen (NORCO) 5-325 MG Tab per tablet    CBC WITH DIFFERENTIAL    Comp Metabolic Panel    LIPASE      2. Left flank pain  CT-RENAL COLIC EVALUATION(A/P W/O)    ketorolac (Toradol) 15 MG/ML injection 15 mg    CBC WITH DIFFERENTIAL    Comp Metabolic Panel    LIPASE        Differentials discussed with patient.  She does have moderate amount of cyst today.  No other signs of UTI or pyelonephritis.  Her pain is in the same location that it always presents.  Although she has underwent 2 separate CT scans with contrast, she has never had a CT renal colic.  Due to the location of her pain and moderate amount of blood in her urine it is possible she may be suffering from renal calculi.  CT renal colic ordered and I will notify her of results.  She has been using over-the-counter ibuprofen and Tylenol with no relief of discomfort.  She has tolerated opioid pain relievers in the past and states that this is helpful in reducing her discomfort.  Short course of Norco was prescribed.   was reviewed and I do not believe that she has any increased risk for abuse of this medication.  Patient to seek care in emergency room if symptoms worsen or if pain becomes intolerable.  AVS handout given and reviewed with patient. Pt educated on red flags and when to seek treatment back in ER or UC.

## 2025-05-05 ENCOUNTER — OFFICE VISIT (OUTPATIENT)
Dept: MEDICAL GROUP | Age: 50
End: 2025-05-05
Payer: COMMERCIAL

## 2025-05-05 VITALS
WEIGHT: 128 LBS | HEIGHT: 60 IN | TEMPERATURE: 98.1 F | OXYGEN SATURATION: 99 % | BODY MASS INDEX: 25.13 KG/M2 | DIASTOLIC BLOOD PRESSURE: 84 MMHG | HEART RATE: 85 BPM | SYSTOLIC BLOOD PRESSURE: 126 MMHG

## 2025-05-05 DIAGNOSIS — Z12.31 ENCOUNTER FOR SCREENING MAMMOGRAM FOR BREAST CANCER: ICD-10-CM

## 2025-05-05 DIAGNOSIS — N63.0 LUMP IN FEMALE BREAST: ICD-10-CM

## 2025-05-05 DIAGNOSIS — Z11.59 NEED FOR HEPATITIS C SCREENING TEST: ICD-10-CM

## 2025-05-05 DIAGNOSIS — Z12.11 SCREENING FOR COLORECTAL CANCER: ICD-10-CM

## 2025-05-05 DIAGNOSIS — Z11.4 SCREENING FOR HIV (HUMAN IMMUNODEFICIENCY VIRUS): ICD-10-CM

## 2025-05-05 DIAGNOSIS — Z13.0 SCREENING FOR DEFICIENCY ANEMIA: ICD-10-CM

## 2025-05-05 DIAGNOSIS — Z12.12 SCREENING FOR COLORECTAL CANCER: ICD-10-CM

## 2025-05-05 DIAGNOSIS — Z80.3 FAMILY HISTORY OF BREAST CANCER: ICD-10-CM

## 2025-05-05 DIAGNOSIS — R79.89 LOW VITAMIN D LEVEL: ICD-10-CM

## 2025-05-05 DIAGNOSIS — E78.00 PURE HYPERCHOLESTEROLEMIA: ICD-10-CM

## 2025-05-05 PROCEDURE — 3074F SYST BP LT 130 MM HG: CPT | Performed by: STUDENT IN AN ORGANIZED HEALTH CARE EDUCATION/TRAINING PROGRAM

## 2025-05-05 PROCEDURE — 3079F DIAST BP 80-89 MM HG: CPT | Performed by: STUDENT IN AN ORGANIZED HEALTH CARE EDUCATION/TRAINING PROGRAM

## 2025-05-05 PROCEDURE — 99214 OFFICE O/P EST MOD 30 MIN: CPT | Performed by: STUDENT IN AN ORGANIZED HEALTH CARE EDUCATION/TRAINING PROGRAM

## 2025-05-05 RX ORDER — SIMVASTATIN 40 MG
40 TABLET ORAL NIGHTLY
Qty: 100 TABLET | Refills: 0 | Status: SHIPPED | OUTPATIENT
Start: 2025-05-05 | End: 2025-08-13

## 2025-05-05 ASSESSMENT — FIBROSIS 4 INDEX: FIB4 SCORE: 0.56

## 2025-05-05 ASSESSMENT — PATIENT HEALTH QUESTIONNAIRE - PHQ9: CLINICAL INTERPRETATION OF PHQ2 SCORE: 0

## 2025-05-06 NOTE — PROGRESS NOTES
Verbal consent was acquired by the patient to use [employer] ambient listening note generation during this visit     Subjective:     HPI:   History of Present Illness  The patient presents for evaluation of a lump in her left breast, elevated cholesterol levels, and left upper quadrant pain. She is accompanied by her daughter.    Approximately 3 months ago, she discovered a persistent, tender lump in the left upper outer quadrant of her breast. There is a significant family history of breast cancer, with her mother and aunt both succumbing to the disease at ages 40 and 41, respectively. Her mother had metastatic breast cancer, underwent a hysterectomy, and was put on hormone patches. Initially, her doctor dismissed the lump, but it had spread to her bones by the time she returned a month later. Her aunt's cancer spread to all her organs. She is unsure if her mother had the BRCA gene. She tested negative for the BRCA gene approximately 20 years ago. Due to dense breast tissue, she has been undergoing routine mammograms and ultrasounds alternately, but the last one was in 2019.    She has a history of high cholesterol, which she left untreated for some time. She discontinued her medication about 9 months ago due to intolerance. Her last lipid panel in 2022 showed a total cholesterol level of 400 and an LDL of 300. She has been on and off medication since her diagnosis at age 26, including Lipitor, Zetia, and Crestor, but discontinued these due to side effects. She has not tried Zocor. She has not had any genetic testing for cholesterol.    She reports constant left upper quadrant pain, which is manageable but occasionally intensifies, prompting visits to urgent care. She has undergone several CT scans, x-rays, and ultrasounds over the past 2 years, but her condition remains undiagnosed. She has attempted to identify food triggers but found no correlation. She recently discovered a gluten intolerance, which she  "identified through trial and error with certain foods. Switching to a gluten-free diet improved her symptoms. She reports no blood in her stool. She has had an upper endoscopy.    She is not a vegetarian or vegan. She is lactose intolerant and does not consume milk. She initially suspected the lump to be hormonal in nature, as she experiences breast tenderness during her menstrual cycle, but the lump remains constant while the right-sided breast tenderness fluctuates. She has no history of drug abuse but admits to past alcohol use.    She has not had a Pap smear since 2019, following a tubal ligation and uterine ablation.    PAST SURGICAL HISTORY:  - Tubal ligation  - Uterine ablation    SOCIAL HISTORY  She reports no history of drug abuse but admits to past alcohol use.    FAMILY HISTORY  Her mother  of breast cancer at age 40. Her aunt (mother's sister)  of breast cancer at age 41. Her paternal uncle had diabetes. Heart disease runs in her family. Her father and brothers have high cholesterol and heart disease.    Health Maintenance: Completed  No n/v/d/c, fever, chills, sob, chest pain. Left breast pain+      Objective:     Exam:  /84 (BP Location: Left arm, Patient Position: Sitting, BP Cuff Size: Adult)   Pulse 85   Temp 36.7 °C (98.1 °F) (Temporal)   Ht 1.53 m (5' 0.25\")   Wt 58.1 kg (128 lb)   SpO2 99%   BMI 24.79 kg/m²  Body mass index is 24.79 kg/m².    Physical Exam  Gen: nad  HENT: ncat, EOMI  Resp: ctabl  Cardiac: rrr, no m  GI: nt/nd  Left Breast: mildly tender mobile nonerythematous 0.5 cm x 0.5 cm lump at 2 o'clock position. No skin indentations or nipple discharge  Neuro: no focal deficits, cn 2-12 intact throughout, sensation to light touch intact throughout  Psych: appropriate mood and affect      Results  Labs   - Lipid Panel: , Total cholesterol: 400 mg/dL, LDL: 300 mg/dL    Assessment & Plan:     1. Family history of breast cancer  Referral to Genetic Research Studies    "   2. Lump in female breast  US-BREAST COMPLETE-LEFT    CANCELED: US-BREAST COMPLETE-LEFT      3. Pure hypercholesterolemia  Comp Metabolic Panel    HEMOGLOBIN A1C    Lipid Profile    TSH WITH REFLEX TO FT4    REFERRAL TO CARDIOLOGY    simvastatin (ZOCOR) 40 MG Tab    Referral to Genetic Research Studies      4. Encounter for screening mammogram for breast cancer  CANCELED: MA-SCREENING MAMMO BILAT W/CAD      5. Screening for colorectal cancer  Referral to GI for Colonoscopy      6. Screening for deficiency anemia  CBC WITHOUT DIFFERENTIAL      7. Low vitamin D level  VITAMIN D,25 HYDROXY (DEFICIENCY)      8. Screening for HIV (human immunodeficiency virus)  HIV AG/AB COMBO ASSAY SCREENING      9. Need for hepatitis C screening test  HEP C VIRUS ANTIBODY          Assessment & Plan  1. Breast lump.  - She reported a lump in the left upper outer quadrant of her breast, which has been present for about 3 months and is consistently sore and tender.  - Physical examination revealed a pea-sized nodule, approximately half a centimeter, with tenderness in the left upper outer quadrant.  - Given her family history of breast cancer and dense breast tissue, an ultrasound of the left breast will be ordered stat. Referral to genetics will be made for further evaluation.  - The patient will be referred to the high-risk breast clinic and genetics for further evaluation after the work up is completed.     2. Elevated cholesterol levels.  - She has a significant family history of high cholesterol and heart disease. She has been off Crestor and Zetia for about 5 months due to intolerance.  - Per patient, the last lipid panel was done in 2022, with total cholesterol over 400 and LDL around 300.  - A prescription for simvastatin will be provided, and she is advised to take CoQ10 supplements to mitigate potential side effects. A referral to cardiology will be made for further management.  - Fasting labs, including CBC, CMP, A1c,  cholesterol, TSH, and vitamin D, will be ordered.    3. Left upper quadrant pain.  - She reported constant pain in the left upper quadrant for almost 2 years, which has not been resolved despite multiple imaging studies and an upper endoscopy.  - Physical examination and imaging studies have not identified the cause of the pain.  - She is advised to discuss this pain with the gastroenterologist during her upcoming colonoscopy appointment.  - A referral to gastroenterology for a colonoscopy will be made.    4. Health maintenance.  - She is due for a Pap smear, which can be scheduled with either me or a gynecologist.  - Hepatitis C and HIV tests will be ordered.  - The patient will follow up in 3 to 4 weeks.          Return in about 3 weeks (around 5/26/2025) for lab results.    Please note that this dictation was created using voice recognition software. I have made every reasonable attempt to correct obvious errors, but I expect that there are errors of grammar and possibly content that I did not discover before finalizing the note.

## 2025-05-10 ENCOUNTER — HOSPITAL ENCOUNTER (OUTPATIENT)
Dept: LAB | Facility: MEDICAL CENTER | Age: 50
End: 2025-05-10
Attending: STUDENT IN AN ORGANIZED HEALTH CARE EDUCATION/TRAINING PROGRAM
Payer: COMMERCIAL

## 2025-05-10 DIAGNOSIS — Z11.4 SCREENING FOR HIV (HUMAN IMMUNODEFICIENCY VIRUS): ICD-10-CM

## 2025-05-10 DIAGNOSIS — R79.89 LOW VITAMIN D LEVEL: ICD-10-CM

## 2025-05-10 DIAGNOSIS — E78.00 PURE HYPERCHOLESTEROLEMIA: ICD-10-CM

## 2025-05-10 DIAGNOSIS — Z11.59 NEED FOR HEPATITIS C SCREENING TEST: ICD-10-CM

## 2025-05-10 DIAGNOSIS — Z13.0 SCREENING FOR DEFICIENCY ANEMIA: ICD-10-CM

## 2025-05-10 LAB
25(OH)D3 SERPL-MCNC: 19 NG/ML (ref 30–100)
ALBUMIN SERPL BCP-MCNC: 4.5 G/DL (ref 3.2–4.9)
ALBUMIN/GLOB SERPL: 1.3 G/DL
ALP SERPL-CCNC: 56 U/L (ref 30–99)
ALT SERPL-CCNC: 22 U/L (ref 2–50)
ANION GAP SERPL CALC-SCNC: 12 MMOL/L (ref 7–16)
AST SERPL-CCNC: 25 U/L (ref 12–45)
BILIRUB SERPL-MCNC: 0.3 MG/DL (ref 0.1–1.5)
BUN SERPL-MCNC: 12 MG/DL (ref 8–22)
CALCIUM ALBUM COR SERPL-MCNC: 8.7 MG/DL (ref 8.5–10.5)
CALCIUM SERPL-MCNC: 9.1 MG/DL (ref 8.5–10.5)
CHLORIDE SERPL-SCNC: 106 MMOL/L (ref 96–112)
CHOLEST SERPL-MCNC: 384 MG/DL (ref 100–199)
CO2 SERPL-SCNC: 25 MMOL/L (ref 20–33)
CREAT SERPL-MCNC: 0.89 MG/DL (ref 0.5–1.4)
ERYTHROCYTE [DISTWIDTH] IN BLOOD BY AUTOMATED COUNT: 42.5 FL (ref 35.9–50)
EST. AVERAGE GLUCOSE BLD GHB EST-MCNC: 111 MG/DL
FASTING STATUS PATIENT QL REPORTED: NORMAL
GFR SERPLBLD CREATININE-BSD FMLA CKD-EPI: 80 ML/MIN/1.73 M 2
GLOBULIN SER CALC-MCNC: 3.4 G/DL (ref 1.9–3.5)
GLUCOSE SERPL-MCNC: 101 MG/DL (ref 65–99)
HBA1C MFR BLD: 5.5 % (ref 4–5.6)
HCT VFR BLD AUTO: 43.5 % (ref 37–47)
HCV AB SER QL: NORMAL
HDLC SERPL-MCNC: 51 MG/DL
HGB BLD-MCNC: 13.9 G/DL (ref 12–16)
HIV 1+2 AB+HIV1 P24 AG SERPL QL IA: NORMAL
LDLC SERPL CALC-MCNC: 292 MG/DL
MCH RBC QN AUTO: 29.1 PG (ref 27–33)
MCHC RBC AUTO-ENTMCNC: 32 G/DL (ref 32.2–35.5)
MCV RBC AUTO: 91 FL (ref 81.4–97.8)
PLATELET # BLD AUTO: 348 K/UL (ref 164–446)
PMV BLD AUTO: 10 FL (ref 9–12.9)
POTASSIUM SERPL-SCNC: 4.1 MMOL/L (ref 3.6–5.5)
PROT SERPL-MCNC: 7.9 G/DL (ref 6–8.2)
RBC # BLD AUTO: 4.78 M/UL (ref 4.2–5.4)
SODIUM SERPL-SCNC: 143 MMOL/L (ref 135–145)
TRIGL SERPL-MCNC: 207 MG/DL (ref 0–149)
TSH SERPL DL<=0.005 MIU/L-ACNC: 1.46 UIU/ML (ref 0.38–5.33)
WBC # BLD AUTO: 4.1 K/UL (ref 4.8–10.8)

## 2025-05-10 PROCEDURE — 80061 LIPID PANEL: CPT

## 2025-05-10 PROCEDURE — 36415 COLL VENOUS BLD VENIPUNCTURE: CPT

## 2025-05-10 PROCEDURE — 84443 ASSAY THYROID STIM HORMONE: CPT

## 2025-05-10 PROCEDURE — 85027 COMPLETE CBC AUTOMATED: CPT

## 2025-05-10 PROCEDURE — 83036 HEMOGLOBIN GLYCOSYLATED A1C: CPT

## 2025-05-10 PROCEDURE — 86803 HEPATITIS C AB TEST: CPT

## 2025-05-10 PROCEDURE — 80053 COMPREHEN METABOLIC PANEL: CPT

## 2025-05-10 PROCEDURE — 82306 VITAMIN D 25 HYDROXY: CPT

## 2025-05-10 PROCEDURE — 87389 HIV-1 AG W/HIV-1&-2 AB AG IA: CPT

## 2025-05-10 NOTE — Clinical Note
REFERRAL APPROVAL NOTICE         Sent on May 10, 2025                   Ling Granda  [address]  [address]  [place] [id number]                   Dear Ms. Norwich,    After a careful review of the medical information and benefit coverage, [facility] has processed your referral. See below for additional details.    If applicable, you must be actively enrolled with your insurance for coverage of the authorized service. If you have any questions regarding your coverage, please contact your insurance directly.    REFERRAL INFORMATION   Referral #:  [id number]  Referred-To Provider    Referred-By Provider:  Gastroenterology    Trevor Mclean M.D.   [facility]      [address] [place] [id number] [phone] [address] [place] [id number]  [phone]    Referral Start Date:  05/05/2025  Referral End Date:   05/05/2026             SCHEDULING  If you do not already have an appointment, please call [phone] to make an appointment.     MORE INFORMATION  If you do not already have a [employer] account, sign up at: [email or web address]  You can access your medical information, make appointments, see lab results, billing information, and more.  If you have questions regarding this referral, please contact  the [facility] Referrals department at:             [phone]. Monday - Friday 8:00AM - 5:00PM.     Sincerely,    [facility]

## 2025-05-12 ENCOUNTER — OFFICE VISIT (OUTPATIENT)
Dept: URGENT CARE | Facility: PHYSICIAN GROUP | Age: 50
End: 2025-05-12
Payer: COMMERCIAL

## 2025-05-12 ENCOUNTER — RESULTS FOLLOW-UP (OUTPATIENT)
Dept: MEDICAL GROUP | Age: 50
End: 2025-05-12

## 2025-05-12 ENCOUNTER — HOSPITAL ENCOUNTER (EMERGENCY)
Facility: MEDICAL CENTER | Age: 50
End: 2025-05-12
Attending: EMERGENCY MEDICINE
Payer: COMMERCIAL

## 2025-05-12 VITALS
TEMPERATURE: 97.5 F | DIASTOLIC BLOOD PRESSURE: 98 MMHG | HEART RATE: 128 BPM | HEIGHT: 60 IN | BODY MASS INDEX: 24.74 KG/M2 | SYSTOLIC BLOOD PRESSURE: 160 MMHG | RESPIRATION RATE: 12 BRPM | OXYGEN SATURATION: 96 % | WEIGHT: 126 LBS

## 2025-05-12 VITALS
WEIGHT: 126.54 LBS | HEART RATE: 94 BPM | HEIGHT: 60 IN | BODY MASS INDEX: 24.84 KG/M2 | OXYGEN SATURATION: 97 % | RESPIRATION RATE: 16 BRPM | TEMPERATURE: 97.8 F | SYSTOLIC BLOOD PRESSURE: 129 MMHG | DIASTOLIC BLOOD PRESSURE: 71 MMHG

## 2025-05-12 DIAGNOSIS — R00.0 TACHYCARDIA: ICD-10-CM

## 2025-05-12 DIAGNOSIS — R10.32 ABDOMINAL PAIN, LLQ: ICD-10-CM

## 2025-05-12 DIAGNOSIS — I10 HYPERTENSION, UNSPECIFIED TYPE: ICD-10-CM

## 2025-05-12 DIAGNOSIS — R03.0 ELEVATED BLOOD PRESSURE READING: ICD-10-CM

## 2025-05-12 DIAGNOSIS — S39.011A FLANK STRAIN, INITIAL ENCOUNTER: ICD-10-CM

## 2025-05-12 LAB
ALBUMIN SERPL BCP-MCNC: 4.5 G/DL (ref 3.2–4.9)
ALBUMIN/GLOB SERPL: 1.4 G/DL
ALP SERPL-CCNC: 60 U/L (ref 30–99)
ALT SERPL-CCNC: 21 U/L (ref 2–50)
ANION GAP SERPL CALC-SCNC: 13 MMOL/L (ref 7–16)
APPEARANCE UR: CLEAR
APPEARANCE UR: CLEAR
AST SERPL-CCNC: 24 U/L (ref 12–45)
BASOPHILS # BLD AUTO: 0.4 % (ref 0–1.8)
BASOPHILS # BLD: 0.03 K/UL (ref 0–0.12)
BILIRUB SERPL-MCNC: 0.8 MG/DL (ref 0.1–1.5)
BILIRUB UR QL STRIP.AUTO: NEGATIVE
BILIRUB UR STRIP-MCNC: NORMAL MG/DL
BUN SERPL-MCNC: 8 MG/DL (ref 8–22)
CALCIUM ALBUM COR SERPL-MCNC: 8.7 MG/DL (ref 8.5–10.5)
CALCIUM SERPL-MCNC: 9.1 MG/DL (ref 8.5–10.5)
CHLORIDE SERPL-SCNC: 102 MMOL/L (ref 96–112)
CO2 SERPL-SCNC: 22 MMOL/L (ref 20–33)
COLOR UR AUTO: YELLOW
COLOR UR: YELLOW
CREAT SERPL-MCNC: 0.88 MG/DL (ref 0.5–1.4)
EOSINOPHIL # BLD AUTO: 0.01 K/UL (ref 0–0.51)
EOSINOPHIL NFR BLD: 0.1 % (ref 0–6.9)
ERYTHROCYTE [DISTWIDTH] IN BLOOD BY AUTOMATED COUNT: 41.3 FL (ref 35.9–50)
GFR SERPLBLD CREATININE-BSD FMLA CKD-EPI: 81 ML/MIN/1.73 M 2
GLOBULIN SER CALC-MCNC: 3.2 G/DL (ref 1.9–3.5)
GLUCOSE SERPL-MCNC: 105 MG/DL (ref 65–99)
GLUCOSE UR STRIP.AUTO-MCNC: NEGATIVE MG/DL
GLUCOSE UR STRIP.AUTO-MCNC: NORMAL MG/DL
HCT VFR BLD AUTO: 40.9 % (ref 37–47)
HGB BLD-MCNC: 13.7 G/DL (ref 12–16)
IMM GRANULOCYTES # BLD AUTO: 0.03 K/UL (ref 0–0.11)
IMM GRANULOCYTES NFR BLD AUTO: 0.4 % (ref 0–0.9)
KETONES UR STRIP.AUTO-MCNC: NEGATIVE MG/DL
KETONES UR STRIP.AUTO-MCNC: NORMAL MG/DL
LEUKOCYTE ESTERASE UR QL STRIP.AUTO: NEGATIVE
LEUKOCYTE ESTERASE UR QL STRIP.AUTO: NORMAL
LIPASE SERPL-CCNC: 20 U/L (ref 11–82)
LYMPHOCYTES # BLD AUTO: 1.22 K/UL (ref 1–4.8)
LYMPHOCYTES NFR BLD: 14.8 % (ref 22–41)
MCH RBC QN AUTO: 29.7 PG (ref 27–33)
MCHC RBC AUTO-ENTMCNC: 33.5 G/DL (ref 32.2–35.5)
MCV RBC AUTO: 88.7 FL (ref 81.4–97.8)
MICRO URNS: NORMAL
MONOCYTES # BLD AUTO: 0.39 K/UL (ref 0–0.85)
MONOCYTES NFR BLD AUTO: 4.7 % (ref 0–13.4)
NEUTROPHILS # BLD AUTO: 6.54 K/UL (ref 1.82–7.42)
NEUTROPHILS NFR BLD: 79.6 % (ref 44–72)
NITRITE UR QL STRIP.AUTO: NEGATIVE
NITRITE UR QL STRIP.AUTO: NORMAL
NRBC # BLD AUTO: 0 K/UL
NRBC BLD-RTO: 0 /100 WBC (ref 0–0.2)
PH UR STRIP.AUTO: 5.5 [PH] (ref 5–8)
PH UR STRIP.AUTO: 5.5 [PH] (ref 5–8)
PLATELET # BLD AUTO: 317 K/UL (ref 164–446)
PMV BLD AUTO: 9.6 FL (ref 9–12.9)
POCT INT CON NEG: NEGATIVE
POCT INT CON POS: POSITIVE
POCT URINE PREGNANCY TEST: NEGATIVE
POTASSIUM SERPL-SCNC: 4 MMOL/L (ref 3.6–5.5)
PROT SERPL-MCNC: 7.7 G/DL (ref 6–8.2)
PROT UR QL STRIP: NEGATIVE MG/DL
PROT UR QL STRIP: NORMAL MG/DL
RBC # BLD AUTO: 4.61 M/UL (ref 4.2–5.4)
RBC UR QL AUTO: NEGATIVE
RBC UR QL AUTO: NORMAL
SODIUM SERPL-SCNC: 137 MMOL/L (ref 135–145)
SP GR UR STRIP.AUTO: 1
SP GR UR STRIP.AUTO: 1.02
UROBILINOGEN UR STRIP-MCNC: 0.2 MG/DL
UROBILINOGEN UR STRIP.AUTO-MCNC: 0.2 EU/DL
WBC # BLD AUTO: 8.2 K/UL (ref 4.8–10.8)

## 2025-05-12 PROCEDURE — 81025 URINE PREGNANCY TEST: CPT | Performed by: FAMILY MEDICINE

## 2025-05-12 PROCEDURE — 96376 TX/PRO/DX INJ SAME DRUG ADON: CPT

## 2025-05-12 PROCEDURE — 80053 COMPREHEN METABOLIC PANEL: CPT

## 2025-05-12 PROCEDURE — 3077F SYST BP >= 140 MM HG: CPT | Performed by: FAMILY MEDICINE

## 2025-05-12 PROCEDURE — 81002 URINALYSIS NONAUTO W/O SCOPE: CPT | Performed by: FAMILY MEDICINE

## 2025-05-12 PROCEDURE — 96374 THER/PROPH/DIAG INJ IV PUSH: CPT

## 2025-05-12 PROCEDURE — 36415 COLL VENOUS BLD VENIPUNCTURE: CPT

## 2025-05-12 PROCEDURE — 99285 EMERGENCY DEPT VISIT HI MDM: CPT

## 2025-05-12 PROCEDURE — 99214 OFFICE O/P EST MOD 30 MIN: CPT | Performed by: FAMILY MEDICINE

## 2025-05-12 PROCEDURE — 85025 COMPLETE CBC W/AUTO DIFF WBC: CPT

## 2025-05-12 PROCEDURE — A9270 NON-COVERED ITEM OR SERVICE: HCPCS | Performed by: EMERGENCY MEDICINE

## 2025-05-12 PROCEDURE — 700111 HCHG RX REV CODE 636 W/ 250 OVERRIDE (IP): Performed by: EMERGENCY MEDICINE

## 2025-05-12 PROCEDURE — 3080F DIAST BP >= 90 MM HG: CPT | Performed by: FAMILY MEDICINE

## 2025-05-12 PROCEDURE — 81003 URINALYSIS AUTO W/O SCOPE: CPT

## 2025-05-12 PROCEDURE — 700102 HCHG RX REV CODE 250 W/ 637 OVERRIDE(OP): Performed by: EMERGENCY MEDICINE

## 2025-05-12 PROCEDURE — 83690 ASSAY OF LIPASE: CPT

## 2025-05-12 RX ORDER — MORPHINE SULFATE 4 MG/ML
4 INJECTION INTRAVENOUS ONCE
Status: COMPLETED | OUTPATIENT
Start: 2025-05-12 | End: 2025-05-12

## 2025-05-12 RX ORDER — CYCLOBENZAPRINE HCL 10 MG
10 TABLET ORAL ONCE
Status: COMPLETED | OUTPATIENT
Start: 2025-05-12 | End: 2025-05-12

## 2025-05-12 RX ORDER — MORPHINE SULFATE 4 MG/ML
4 INJECTION INTRAVENOUS ONCE
Status: DISCONTINUED | OUTPATIENT
Start: 2025-05-12 | End: 2025-05-12

## 2025-05-12 RX ADMIN — MORPHINE SULFATE 4 MG: 4 INJECTION INTRAVENOUS at 19:09

## 2025-05-12 RX ADMIN — MORPHINE SULFATE 4 MG: 4 INJECTION INTRAVENOUS at 17:51

## 2025-05-12 RX ADMIN — CYCLOBENZAPRINE 10 MG: 10 TABLET, FILM COATED ORAL at 17:51

## 2025-05-12 ASSESSMENT — ENCOUNTER SYMPTOMS
FLANK PAIN: 0
SHORTNESS OF BREATH: 0
CHILLS: 0
MYALGIAS: 0
VOMITING: 0
SORE THROAT: 0
DIZZINESS: 0
NAUSEA: 0
ANOREXIA: 0
COUGH: 0
FEVER: 0

## 2025-05-12 ASSESSMENT — FIBROSIS 4 INDEX
FIB4 SCORE: 0.74
FIB4 SCORE: 0.74

## 2025-05-12 ASSESSMENT — PAIN DESCRIPTION - PAIN TYPE
TYPE: ACUTE PAIN
TYPE: ACUTE PAIN

## 2025-05-12 NOTE — ED TRIAGE NOTES
"Chief Complaint   Patient presents with    Abdominal Pain     Tl left lateral Mid AB. Pinpoint x 2 years. Reports pain had decreased x past four months. Pain increased today. Pt reports \"flare up.\" -N/V/D. Reports normal BM and normal urination.      Pt was seen at  today. UA was performed and pt was told to come to ER.     Pt ambulates from lobby with steady gait. Skin signs pink, warm, dry. Pt speaking full sentences, A & O x 4. Respirations equal and unlabored. Pt educated on triage process and to alert staff of any changing conditions. Pt returned to phlebotomy.     BP (!) 157/86   Pulse 85   Temp 36.6 °C (97.8 °F) (Temporal)   Resp 18   Ht 1.524 m (5')   Wt 57.4 kg (126 lb 8.7 oz)   SpO2 95%   BMI 24.71 kg/m²       "

## 2025-05-12 NOTE — PROGRESS NOTES
Subjective:   Ling Granda is a 49 y.o. female who presents for Flank Pain (Follow up, x4 months )        49-year-old presents to urgent care with report of left lower quadrant abdominal pain.  Reports intermittent left lower quadrant abdominal pain over the past 2 years which recently worsening over the past few days.      LLQ Pain  This is a recurrent problem. The onset quality is gradual. The problem occurs intermittently. The problem has been gradually worsening. The pain is located in the LLQ. The pain is moderate. The quality of the pain is aching. Pertinent negatives include no anorexia, dysuria, fever, frequency, myalgias, nausea or vomiting. Prior diagnostic workup includes CT scan.     PMH:  has a past medical history of DDD (degenerative disc disease), High cholesterol, and Mitral valve regurgitation.  MEDS:   Current Outpatient Medications:     simvastatin (ZOCOR) 40 MG Tab, Take 1 Tablet by mouth every evening for 100 days., Disp: 100 Tablet, Rfl: 0  ALLERGIES:   Allergies   Allergen Reactions    Atorvastatin Myalgia    Rosuvastatin Myalgia     SURGHX:   Past Surgical History:   Procedure Laterality Date    ENDOSCOPY SM INTEST W/BIOPSY  02/2024     SOCHX:  reports that she quit smoking about 6 years ago. Her smoking use included cigarettes. She started smoking about 34 years ago. She has a 14 pack-year smoking history. She has never used smokeless tobacco. She reports current alcohol use. She reports that she does not use drugs.  FH:   Family History   Problem Relation Age of Onset    Breast Cancer Mother 40    Breast Cancer Maternal Aunt 41    Diabetes Neg Hx      Review of Systems   Constitutional:  Negative for chills and fever.   HENT:  Negative for sore throat.    Respiratory:  Negative for cough and shortness of breath.    Gastrointestinal:  Negative for anorexia, nausea and vomiting.   Genitourinary:  Negative for dysuria, flank pain and frequency.   Musculoskeletal:  Negative for  myalgias.   Skin:  Negative for rash.   Neurological:  Negative for dizziness.        Objective:   BP (!) 160/98   Pulse (!) 128   Temp 36.4 °C (97.5 °F) (Temporal)   Resp 12   Ht 1.524 m (5')   Wt 57.2 kg (126 lb)   SpO2 96%   BMI 24.61 kg/m²   Physical Exam  Vitals and nursing note reviewed.   Constitutional:       General: She is not in acute distress.     Appearance: She is well-developed.   HENT:      Head: Normocephalic and atraumatic.      Right Ear: External ear normal.      Left Ear: External ear normal.      Nose: Nose normal.      Mouth/Throat:      Mouth: Mucous membranes are moist.   Eyes:      Conjunctiva/sclera: Conjunctivae normal.   Cardiovascular:      Rate and Rhythm: Normal rate.   Pulmonary:      Effort: Pulmonary effort is normal. No respiratory distress.      Breath sounds: Normal breath sounds.   Abdominal:      General: Abdomen is flat. Bowel sounds are normal. There is no distension.      Palpations: Abdomen is soft.      Tenderness: There is abdominal tenderness in the left lower quadrant. There is guarding (Voluntary left lower quadrant). There is no rebound. Negative signs include Vides's sign and McBurney's sign.   Musculoskeletal:         General: Normal range of motion.   Skin:     General: Skin is warm and dry.   Neurological:      General: No focal deficit present.      Mental Status: She is alert and oriented to person, place, and time. Mental status is at baseline.      Gait: Gait (gait at baseline) normal.   Psychiatric:         Judgment: Judgment normal.           Assessment/Plan:   1. Abdominal pain, LLQ  - POCT Urinalysis  - POCT Pregnancy    2. Tachycardia  - POCT Urinalysis  - POCT Pregnancy        Medical Decision Making/Course:  In the context of the left lower quadrant abdominal pain with significant physical exam findings and tachycardia, there is a clinical concern for significant acute abdominal pelvic pathology and accordingly emergency department evaluation  management is warranted.  The patient deferred EMS and was transferred.  The [facility] emergency department was advised and transfer center notified.  In the course of preparing for this visit with review of the pertinent past medical history, recent and past clinic visits, current medications, and performing chart, immunization, medical history and medication reconciliation, and in the further course of obtaining the current history pertinent to the clinic visit today, performing an exam and evaluation, ordering and independently evaluating labs, tests including point-of-care urinalysis and urine pregnancy which is negative, and/or procedures, prescribing any recommended new medications as noted above, providing any pertinent counseling and education and recommending further coordination of care, at least  38 minutes of total time were spent during this encounter.        Please note that this dictation was created using voice recognition software. I have worked with consultants from the vendor as well as technical experts from [facility] to optimize the interface. I have made every reasonable attempt to correct obvious errors, but I expect that there are errors of grammar and possibly content that I did not discover before finalizing the note.

## 2025-05-13 NOTE — ED PROVIDER NOTES
"ER Provider Note    Scribed for  Andrew De Leon D.O. by Nakia Armijo. 5/12/2025   5:24 PM    Primary Care Provider: Malika Engel M.D. (Inactive)    CHIEF COMPLAINT  Chief Complaint   Patient presents with    Abdominal Pain     Tl left lateral Mid AB. Pinpoint x 2 years. Reports pain had decreased x past four months. Pain increased today. Pt reports \"flare up.\" -N/V/D. Reports normal BM and normal urination.      EXTERNAL RECORDS REVIEWED  Outpatient Notes Seen at [facility] earlier today for similar presentation. clinical concern for significant acute abdominal pelvic pathology and accordingly emergency department evaluation management is warranted. Reviewed CT renal from 1/14/2025.    HPI/ROS  LIMITATION TO HISTORY   Select: : None  OUTSIDE HISTORIAN(S):   at bedside contributing to history as seen below    Ling Granda is a 49 y.o. female with a history of high cholesterol and degenerative disc disease, who presents to the ED for evaluation of intermittent abdominal pain onset two years ago. Patient has had left flank pain for the past two years. Describes as aches and pressure-like. Reports associated back pain, but denies any fever, vomiting, or diarrhea. She states she has had a various workup of imaging and labs in the past, but notes all were unremarkable. She has tried changing her diet, spasm medication, lidocaine patches, heat, and opiate medication, but with no alleviation. No exacerbating factors noted. She denies any twisting movements to worsen her pain. Patient went to Urgent Care in [place] earlier today, and believed her symptoms to align with an aneurysm, prompting her to present here. Former smoker, but denies any current smoking. History of high cholesterol and arthritis.     PAST MEDICAL HISTORY  Past Medical History:   Diagnosis Date    DDD (degenerative disc disease)     High cholesterol     Mitral valve regurgitation        SURGICAL HISTORY  Past Surgical " History:   Procedure Laterality Date    ENDOSCOPY SM INTEST W/BIOPSY  02/2024       FAMILY HISTORY  Family History   Problem Relation Age of Onset    Breast Cancer Mother 40    Breast Cancer Maternal Aunt 41    Diabetes Neg Hx        SOCIAL HISTORY   reports that she quit smoking about 6 years ago. Her smoking use included cigarettes. She started smoking about 34 years ago. She has a 14 pack-year smoking history. She has never used smokeless tobacco. She reports current alcohol use. She reports that she does not use drugs.    CURRENT MEDICATIONS  Discharge Medication List as of 5/12/2025  7:33 PM        CONTINUE these medications which have NOT CHANGED    Details   simvastatin (ZOCOR) 40 MG Tab Take 1 Tablet by mouth every evening for 100 days., Disp-100 Tablet, R-0, Normal             ALLERGIES  Allergies   Allergen Reactions    Atorvastatin Myalgia    Rosuvastatin Myalgia        PHYSICAL EXAM  BP (!) 157/86   Pulse 85   Temp 36.6 °C (97.8 °F) (Temporal)   Resp 18   Ht 1.524 m (5')   Wt 57.4 kg (126 lb 8.7 oz)   SpO2 95%   BMI 24.71 kg/m²    General: No acute distress  Neuro: Awake alert and oriented, muscle strength sensation normal  Neck: Supple  Cardiac: Regular rate and rhythm  Pulmonary: Clear to auscultation bilaterally no distress  Abdomen: Soft nontender nondistended, tenderness to the left of obliques  Back: No CVA tenderness   Psych: Normal  Skin: Pink warm dry  Extremities: Full range of motion, muscle strength sensation intact 2+ pulses    DIAGNOSTIC STUDIES/PROCEDURES  Labs:   Labs Reviewed   CBC WITH DIFFERENTIAL - Abnormal; Notable for the following components:       Result Value    Neutrophils-Polys 79.60 (*)     Lymphocytes 14.80 (*)     All other components within normal limits   COMP METABOLIC PANEL - Abnormal; Notable for the following components:    Glucose 105 (*)     All other components within normal limits   LIPASE   URINALYSIS   ESTIMATED GFR     I have independently interpreted the  above labs       COURSE & MEDICAL DECISION MAKING     INITIAL ASSESSMENT, COURSE AND PLAN  Differential diagnoses include but not limited to: Muscle spasm, Muscle strain      Care Narrative: Patient complaining of 2 years of left sided pain.    5:24 PM - Patient was first seen and evaluated at bedside. Patient presents to the ED for left flank pain with associated back pain. Ordered for Urinalysis, Lipase, CMP, and CBC w/ diff, to evaluate. The patient will be medicated with morphine 4 mg IV and Flexeril 10 mg PO for her symptoms. Patient verbalizes understanding and support with my plan of care.     7:00 PM - I reevaluated the patient at bedside. The patient informs me they feel slightly improved following medication administration. I will treat her with another dose of morphine 4 mg IV before she departs. She has a . I discussed the patient's diagnostic study results which are very reassuring. I also reviewed her past CT and discussed the reassuring results. I had a long discussion about a possible colonoscopy and physical therapy as this could very well be musculoskeletal pain. I discussed plan for discharge and follow up as outlined below. The patient is stable for discharge at this time and will return for any new or worsening symptoms. Patient verbalizes understanding and support with my plan for discharge.      ED COURSE AND ADDITIONAL PROBLEMS    Flank strain acute: Patient has had 2 years of pain.  It is come back in the last day or 2.  She denies any nausea vomiting diarrhea she denies any dysuria or hematuria or flank pain.  On exam there is no abdominal tenderness but on the left sided obliques there are some tenderness in the musculature.  Whether there is spasm is difficult to ascertain.  We reviewed the patient's CT abdomen pelvis without contrast CT abdomen pelvis with and pelvic ultrasound.  All appear to have no findings to explain this person's pain.  She states that she seen multiple  doctors.  She is feeling better after therapy.  She is efrem see primary care and physical therapy in the next 48 hours or return if nothing worsens.  On reevaluation the abdomen is still soft nontender nondistended is only on the lateral obliques that are little bit uncomfortable.    Hypertension acute: Patient agrees to follow-up with primary care.    DISPOSITION AND DISCUSSIONS    I have discussed management of the patient with the following physicians and DAVID's:  None    Discussion of management with other [facility] or appropriate source(s): None     Escalation of care considered, and ultimately not performed: acute inpatient care management, however at this time, the patient is most appropriate for outpatient management.    Barriers to care at this time, including but not limited to:  None .     Decision tools and prescription drugs considered including, but not limited to:  None .    The patient will return for new or worsening symptoms and is stable at the time of discharge.    The patient is referred to a primary physician for blood pressure management, diabetic screening, and for all other preventative health concerns.    DISPOSITION:  Patient will be discharged home in stable condition.    FOLLOW UP:  [facility], Emergency Dept  [address] [place] [id number] [phone]    As needed, If symptoms worsen      OUTPATIENT MEDICATIONS:  Discharge Medication List as of 5/12/2025  7:33 PM          FINAL DIAGNOSIS  1. Flank strain, initial encounter Acute   2. Hypertension, unspecified type Acute        Nakia CAMPUZANO), am scribing for, and in the presence of, Andrew De Leon D.O..    Electronically signed by: Nakia Laguna), 5/12/2025    Andrew CAMPUZANO D.O. personally performed the services described in this documentation, as scribed by Nakia Armijo in my presence, and it is both accurate and complete.      The note accurately reflects work and decisions made by  me.  Andrew De Leon D.O.  5/12/2025  10:32 PM

## 2025-05-13 NOTE — ED NOTES
Pt ambulated to rm 73 from triage.  Steady gait.  Denies n/v/d.  C/o abd pain for 2 years with varying degrees of pain.  Pt reports many complete work ups without dx

## 2025-05-13 NOTE — DISCHARGE INSTRUCTIONS
Followup with your primary care physician. Seek out physical therapy. Look into a chronic pain specialist.

## 2025-05-14 ENCOUNTER — TELEPHONE (OUTPATIENT)
Dept: HEALTH INFORMATION MANAGEMENT | Facility: OTHER | Age: 50
End: 2025-05-14
Payer: COMMERCIAL

## 2025-06-02 ENCOUNTER — HOSPITAL ENCOUNTER (OUTPATIENT)
Dept: RADIOLOGY | Facility: MEDICAL CENTER | Age: 50
End: 2025-06-02
Attending: STUDENT IN AN ORGANIZED HEALTH CARE EDUCATION/TRAINING PROGRAM
Payer: COMMERCIAL

## 2025-06-02 ENCOUNTER — RESULTS FOLLOW-UP (OUTPATIENT)
Dept: MEDICAL GROUP | Age: 50
End: 2025-06-02

## 2025-06-02 DIAGNOSIS — N63.0 LUMP IN FEMALE BREAST: ICD-10-CM

## 2025-06-02 PROCEDURE — G0279 TOMOSYNTHESIS, MAMMO: HCPCS

## 2025-06-02 PROCEDURE — 76642 ULTRASOUND BREAST LIMITED: CPT | Mod: LT

## 2025-06-06 ENCOUNTER — APPOINTMENT (OUTPATIENT)
Dept: URGENT CARE | Facility: PHYSICIAN GROUP | Age: 50
End: 2025-06-06
Payer: COMMERCIAL

## 2025-07-01 ENCOUNTER — OFFICE VISIT (OUTPATIENT)
Dept: URGENT CARE | Facility: PHYSICIAN GROUP | Age: 50
End: 2025-07-01
Payer: COMMERCIAL

## 2025-07-01 VITALS
SYSTOLIC BLOOD PRESSURE: 112 MMHG | OXYGEN SATURATION: 97 % | BODY MASS INDEX: 24.35 KG/M2 | WEIGHT: 124 LBS | DIASTOLIC BLOOD PRESSURE: 74 MMHG | HEART RATE: 85 BPM | TEMPERATURE: 97.5 F | RESPIRATION RATE: 16 BRPM | HEIGHT: 60 IN

## 2025-07-01 DIAGNOSIS — R11.0 NAUSEA: ICD-10-CM

## 2025-07-01 DIAGNOSIS — J01.40 ACUTE NON-RECURRENT PANSINUSITIS: Primary | ICD-10-CM

## 2025-07-01 PROCEDURE — 3074F SYST BP LT 130 MM HG: CPT | Performed by: PHYSICIAN ASSISTANT

## 2025-07-01 PROCEDURE — 3078F DIAST BP <80 MM HG: CPT | Performed by: PHYSICIAN ASSISTANT

## 2025-07-01 PROCEDURE — 99213 OFFICE O/P EST LOW 20 MIN: CPT | Performed by: PHYSICIAN ASSISTANT

## 2025-07-01 RX ORDER — POLYETHYLENE GLYCOL 3350, SODIUM SULFATE ANHYDROUS, SODIUM BICARBONATE, SODIUM CHLORIDE, POTASSIUM CHLORIDE 236; 22.74; 6.74; 5.86; 2.97 G/4L; G/4L; G/4L; G/4L; G/4L
POWDER, FOR SOLUTION ORAL
COMMUNITY
Start: 2025-06-25

## 2025-07-01 RX ORDER — ONDANSETRON 4 MG/1
4 TABLET, ORALLY DISINTEGRATING ORAL EVERY 6 HOURS PRN
Qty: 15 TABLET | Refills: 0 | Status: SHIPPED | OUTPATIENT
Start: 2025-07-01

## 2025-07-01 ASSESSMENT — ENCOUNTER SYMPTOMS
FEVER: 0
DIARRHEA: 0
VOMITING: 0
EYE REDNESS: 0
CHILLS: 0
ABDOMINAL PAIN: 1
SHORTNESS OF BREATH: 0
DIZZINESS: 0
DIAPHORESIS: 0
EYE PAIN: 0
EYE DISCHARGE: 0
CONSTIPATION: 0
SORE THROAT: 0
COUGH: 0
HEADACHES: 0
NAUSEA: 1
SINUS PAIN: 1
WHEEZING: 0

## 2025-07-01 ASSESSMENT — FIBROSIS 4 INDEX: FIB4 SCORE: 0.79

## 2025-07-01 NOTE — LETTER
DES  [facility] [address] [address]     July 1, 2025    Patient: Ling Granda   YOB: 1975   Date of Visit: 7/1/2025       To Whom It May Concern:    Ling Granda was seen and treated in our department on 7/1/2025. Please excuse from work on 6/30 and 7/1    Sincerely,     Chris Ware P.A.-C.

## 2025-07-01 NOTE — PROGRESS NOTES
Subjective:     Ling Granda  is a 49 y.o. female who presents for Congestion (X 4-5 days), Emesis, and Sinus Pain       She presents today with sinus pain pressure ongoing over the last 5 days.  Has associated sinus headache.  Does note vomiting occurring 4-5 days ago but this has resolved, she has lingering nausea that occurs every time she eats.  Notes generalized abdominal pain.  No fevers, no chest pain or shortness of breath, no diarrhea.  She does have a follow-up with gastroenterology in the next several weeks for colonoscopy.  Has used over-the-counter medications for symptom support.       Review of Systems   Constitutional:  Negative for chills, diaphoresis, fever and malaise/fatigue.   HENT:  Positive for congestion and sinus pain. Negative for ear discharge and sore throat.    Eyes:  Negative for pain, discharge and redness.   Respiratory:  Negative for cough, shortness of breath and wheezing.    Cardiovascular:  Negative for chest pain.   Gastrointestinal:  Positive for abdominal pain and nausea. Negative for constipation, diarrhea and vomiting.   Neurological:  Negative for dizziness and headaches.      Allergies[1]  Past Medical History[2]     Objective:   /74   Pulse 85   Temp 36.4 °C (97.5 °F) (Temporal)   Resp 16   Ht 1.524 m (5')   Wt 56.2 kg (124 lb)   SpO2 97%   BMI 24.22 kg/m²   Physical Exam  Vitals and nursing note reviewed.   Constitutional:       General: She is not in acute distress.     Appearance: Normal appearance. She is not ill-appearing, toxic-appearing or diaphoretic.   HENT:      Head: Normocephalic.      Right Ear: Tympanic membrane, ear canal and external ear normal. There is no impacted cerumen.      Left Ear: Tympanic membrane, ear canal and external ear normal. There is no impacted cerumen.      Nose: Congestion present. No rhinorrhea.      Mouth/Throat:      Mouth: Mucous membranes are moist.      Pharynx: No oropharyngeal exudate or posterior  oropharyngeal erythema.   Eyes:      General: No scleral icterus.        Right eye: No discharge.         Left eye: No discharge.      Conjunctiva/sclera: Conjunctivae normal.   Cardiovascular:      Rate and Rhythm: Normal rate and regular rhythm.   Pulmonary:      Effort: Pulmonary effort is normal. No respiratory distress.      Breath sounds: Normal breath sounds. No stridor. No wheezing or rhonchi.   Abdominal:      General: Abdomen is flat. Bowel sounds are normal. There is no distension.      Palpations: Abdomen is soft.      Tenderness: There is abdominal tenderness in the epigastric area and periumbilical area. There is no guarding.   Musculoskeletal:      Cervical back: Neck supple.   Lymphadenopathy:      Cervical: No cervical adenopathy.   Neurological:      General: No focal deficit present.      Mental Status: She is alert and oriented to person, place, and time.   Psychiatric:         Mood and Affect: Mood normal.         Behavior: Behavior normal.         Thought Content: Thought content normal.         Judgment: Judgment normal.             Diagnostic testing: None    Assessment/Plan:     Encounter Diagnoses   Name Primary?    Acute non-recurrent pansinusitis Yes    Nausea          Plan for care for today's complaint includes starting the patient on Augmentin for acute pansinusitis seen on exam today.  Encouraged use of over-the-counter medications for additional symptom relief.  Started on Zofran for nausea symptom support.  She did have mild epigastric and periumbilical tenderness but no abdominal tenderness over the right upper quadrant, right lower quadrant or left lower quadrant.  No signs of acute severe intra-abdominal pathology based on today's exam.  Vital signs were stable during today's office visit, patient was overall well-appearing. Continue to monitor symptoms and return to urgent care or follow-up with primary care provider if symptoms remain ongoing.  Follow-up in the emergency  department if symptoms become severe, ER precautions discussed in office today.  Prescription for Augmentin, Zofran provided.    See AVS Instructions below for written guidance provided to patient on after-visit management and care in addition to our verbal discussion during the visit.    Please note that this dictation was created using voice recognition software. I have attempted to correct all errors, but there may be sound-alike, spelling, grammar and possibly content errors that I did not discover before finalizing the note.    Chrisrosario Ware PA-C       [1]   Allergies  Allergen Reactions    Atorvastatin Myalgia    Rosuvastatin Myalgia   [2]   Past Medical History:  Diagnosis Date    DDD (degenerative disc disease)     High cholesterol     Mitral valve regurgitation

## 2025-07-22 ENCOUNTER — APPOINTMENT (OUTPATIENT)
Dept: MEDICAL GROUP | Age: 50
End: 2025-07-22
Payer: COMMERCIAL

## 2025-07-22 VITALS
BODY MASS INDEX: 25.29 KG/M2 | SYSTOLIC BLOOD PRESSURE: 144 MMHG | DIASTOLIC BLOOD PRESSURE: 82 MMHG | TEMPERATURE: 97.8 F | OXYGEN SATURATION: 100 % | HEIGHT: 60 IN | WEIGHT: 128.8 LBS | HEART RATE: 71 BPM

## 2025-07-22 DIAGNOSIS — N63.0 LUMP IN FEMALE BREAST: ICD-10-CM

## 2025-07-22 DIAGNOSIS — Z12.4 SCREENING FOR CERVICAL CANCER: Primary | ICD-10-CM

## 2025-07-22 DIAGNOSIS — R10.12 LEFT UPPER QUADRANT ABDOMINAL PAIN: ICD-10-CM

## 2025-07-22 DIAGNOSIS — E55.9 VITAMIN D DEFICIENCY: ICD-10-CM

## 2025-07-22 DIAGNOSIS — E78.00 PURE HYPERCHOLESTEROLEMIA: ICD-10-CM

## 2025-07-22 DIAGNOSIS — Z80.3 FAMILY HISTORY OF BREAST CANCER: ICD-10-CM

## 2025-07-22 PROCEDURE — 3079F DIAST BP 80-89 MM HG: CPT | Performed by: STUDENT IN AN ORGANIZED HEALTH CARE EDUCATION/TRAINING PROGRAM

## 2025-07-22 PROCEDURE — 3077F SYST BP >= 140 MM HG: CPT | Performed by: STUDENT IN AN ORGANIZED HEALTH CARE EDUCATION/TRAINING PROGRAM

## 2025-07-22 PROCEDURE — 99214 OFFICE O/P EST MOD 30 MIN: CPT | Performed by: STUDENT IN AN ORGANIZED HEALTH CARE EDUCATION/TRAINING PROGRAM

## 2025-07-22 RX ORDER — ERGOCALCIFEROL 1.25 MG/1
50000 CAPSULE ORAL
Qty: 7 CAPSULE | Refills: 0 | Status: SHIPPED | OUTPATIENT
Start: 2025-07-22

## 2025-07-22 ASSESSMENT — FIBROSIS 4 INDEX: FIB4 SCORE: 0.79

## 2025-07-23 NOTE — PROGRESS NOTES
Verbal consent was acquired by the patient to use [employer] ambient listening note generation during this visit     Subjective:     HPI:   History of Present Illness  The patient is a 49-year-old female who presents for lab follow-up     She has been experiencing persistent pain in her left breast for the past five months, despite previous tests, including ultrasound and mammogram, showing no abnormalities. The pain is constant and sometimes intensifies, although the lump itself does not harden. She describes the area as constantly irritated. Her right breast appears normal. She does not have a regular gynecologist and is due for a Pap smear, having not had one since her surgery. She underwent a bilateral salpingectomy and uterine ablation in 2019 but retains her ovaries.    She has been dealing with left sided abdominal pain for two years. Multiple tests, including CT scans and ultrasounds of both sides, have returned clear results. She believes the pain is internal, located where her colon is. During the colonoscopy prep, the pain subsided, but it returned upon resuming food and water intake, indicating a possible irritation from ingestion. She has sought help from urgent care during severe pain episodes and received pain medication that provided temporary relief. She reports no diarrhea and normal bowel movements. Previous procedures, including a colonoscopy and endoscopy, were normal, as was a uterine ultrasound.    Her cholesterol levels were not as high as she anticipated. She has been on simvastatin since she was 26, with intermittent breaks. She has not yet completed her genetic studies.    She does not consume milk due to lactose intolerance.    Diet: She does not consume milk due to lactose intolerance.    PAST SURGICAL HISTORY:  - Bilateral salpingectomy and uterine ablation in 2019    FAMILY HISTORY  There is a strong family history of breast cancer.    No n/v/d/c, fever, chills, sob, chest  pain      Objective:     Exam:  BP (!) 144/82   Pulse 71   Temp 36.6 °C (97.8 °F)   Ht 1.524 m (5')   Wt 58.4 kg (128 lb 12.8 oz)   SpO2 100%   BMI 25.15 kg/m²  Body mass index is 25.15 kg/m².    Physical Exam  Gen: nad  HENT: ncat, EOMI  Resp: nonlabored breathing  Neuro: no focal deficits, cn 2-12 intact throughout, sensation to light touch intact throughout  Psych: appropriate mood and affect      Results  Labs   - Cholesterol levels: High   - Thyroid levels: Normal   - Vitamin D levels: Deficient    Imaging   - Ultrasound of the breast: No abnormalities   - Mammogram of the breast: No abnormalities    Assessment & Plan:     1. Screening for cervical cancer  Referral to Gynecology      2. Lump in female breast  Referral to Gynecology      3. Family history of breast cancer  Referral to [facility] Breast [facility]      4. Left upper quadrant abdominal pain  Referral to Gastroenterology      5. Pure hypercholesterolemia  Referral to Vascular Medicine    Lipid Profile      6. Vitamin D deficiency  ergocalciferol (DRISDOL) 59762 UNIT capsule          Assessment & Plan  1. Breast pain.  - Reports persistent pain in the left breast despite clear ultrasound and mammogram results.  - Strong family history of breast cancer noted.  - Referral to High Risk Breast Clinic will be made  - Advised to consult with a gynecologist regarding breast pain and to undergo a Pap smear for cervical cancer screening.    2. Abdominal   - Experiencing side pain for 2 years with no definitive diagnosis despite multiple tests including CT scans, ultrasounds, and a colonoscopy.  - Better with bowel prep. Worse with food, even water.   - [facility]erral to [facility] will be made for further evaluation.    3. Hypercholesterolemia.  - Cholesterol levels remain elevated.  - Currently on simvastatin 40 mg qd.  - Referral to Vascular Medicine will be made for further management of hypercholesterolemia.  - Genetic testing will be  conducted, and lipid levels will be reassessed in 6 months.    4. Vitamin D deficiency.  - Vitamin D levels are deficient.  - Prescription for vitamin D supplements will be provided, to be taken once weekly initially, followed by over-the-counter supplements.  - Advised to supplement vitamin D due to lactose intolerance and dietary restrictions.    Follow-up: The patient will follow up in 5 months for an annual wellness check.      Return in about 5 months (around 12/22/2025) for annual visit.    Please note that this dictation was created using voice recognition software. I have made every reasonable attempt to correct obvious errors, but I expect that there are errors of grammar and possibly content that I did not discover before finalizing the note.

## 2025-07-28 NOTE — Clinical Note
REFERRAL APPROVAL NOTICE         Sent on July 28, 2025                   Ling Granda  [address]  [address]  [place] [address]                   Dear Ms. Granda,    After a careful review of the medical information and benefit coverage, [facility] has processed your referral. See below for additional details.    If applicable, you must be actively enrolled with your insurance for coverage of the authorized service. If you have any questions regarding your coverage, please contact your insurance directly.    REFERRAL INFORMATION   Referral #:  [id number]  Referred-To Department    Referred-By Provider:  Hematology Oncology    Trevor Mclean M.D.   [facility]      [address] [place] [id number] [phone] [address]  [place] [id number] [phone]    Referral Start Date:  07/22/2025  Referral End Date:   07/22/2026             SCHEDULING  If you do not already have an appointment, please call [phone] to make an appointment.     MORE INFORMATION  If you do not already have a [employer] account, sign up at: [email or web address]  You can access your medical information, make appointments, see lab results, billing information, and more.  If you have questions regarding this referral, please contact  the [facility] Referrals department at:             [phone]. Monday - Friday 8:00AM - 5:00PM.     Sincerely,    [facility]

## 2025-07-28 NOTE — Clinical Note
REFERRAL APPROVAL NOTICE         Sent on July 28, 2025                   Ling Granda  [address]  [address]  [place] [address]                   Dear Ms. Jesus Alberto,    After a careful review of the medical information and benefit coverage, [facility] has processed your referral. See below for additional details.    If applicable, you must be actively enrolled with your insurance for coverage of the authorized service. If you have any questions regarding your coverage, please contact your insurance directly.    REFERRAL INFORMATION   Referral #:  [id number]  Referred-To Department    Referred-By Provider:  Vascular Medicine    Trevor Mclean M.D.   Vascular Medicine      [address] [place] [address]  [phone] [address] [place] [address]  [place] [address]  [phone]    Referral Start Date:  07/22/2025  Referral End Date:   07/22/2026             SCHEDULING  If you do not already have an appointment, please call [phone] to make an appointment.     MORE INFORMATION  If you do not already have a [employer] account, sign up at: [email or web address]  You can access your medical information, make appointments, see lab results, billing information, and more.  If you have questions regarding this referral, please contact  the [facility] Referrals department at:             [phone]. Monday - Friday 8:00AM - 5:00PM.     Sincerely,    [facility]

## 2025-07-28 NOTE — Clinical Note
REFERRAL APPROVAL NOTICE         Sent on July 28, 2025                   Ling Granda  [address]  [address]  [place] [address]                   Dear Ms. Granda,    After a careful review of the medical information and benefit coverage, [facility] has processed your referral. See below for additional details.    If applicable, you must be actively enrolled with your insurance for coverage of the authorized service. If you have any questions regarding your coverage, please contact your insurance directly.    REFERRAL INFORMATION   Referral #:  [id number]  Referred-To Department    Referred-By Provider:  Gynecology    Trevor Mclean M.D.   [facility]      [address] [place] [id number] [phone] [address] [place] [address], [address]  [place] [id number] [phone]    Referral Start Date:  07/22/2025  Referral End Date:   07/22/2026             SCHEDULING  If you do not already have an appointment, please call [phone] to make an appointment.     MORE INFORMATION  If you do not already have a [employer] account, sign up at: "[email or web address]  You can access your medical information, make appointments, see lab results, billing information, and more.  If you have questions regarding this referral, please contact  the [facility] Referrals department at:             [phone]. Monday - Friday 8:00AM - 5:00PM.     Sincerely,    [facility]

## 2025-07-28 NOTE — Clinical Note
REFERRAL APPROVAL NOTICE         Sent on July 28, 2025                   Lingbarbara Granda  [address]  [address]  [facility] [address]                   Dear MsEmil Jesus Alberto,    After a careful review of the medical information and benefit coverage, [facility] has processed your referral. See below for additional details.    If applicable, you must be actively enrolled with your insurance for coverage of the authorized service. If you have any questions regarding your coverage, please contact your insurance directly.    REFERRAL INFORMATION   Referral #:  [id number]  Referred-To Provider    Referred-By Provider:  Gastroenterology    SOCO Mariano DANIEL R [address] [place] [id number] [phone] [address] [place] [id number] [phone]    Referral Start Date:  07/22/2025  Referral End Date:   07/22/2026             SCHEDULING  If you do not already have an appointment, please call [phone] to make an appointment.     MORE INFORMATION  If you do not already have a [employer] account, sign up at: [email or web address]  You can access your medical information, make appointments, see lab results, billing information, and more.  If you have questions regarding this referral, please contact  the [facility] Referrals department at:             [phone]. Monday - Friday 8:00AM - 5:00PM.     Sincerely,    [facility]

## 2025-08-01 ENCOUNTER — TELEPHONE (OUTPATIENT)
Dept: HEALTH INFORMATION MANAGEMENT | Facility: OTHER | Age: 50
End: 2025-08-01
Payer: COMMERCIAL

## 2025-08-13 ENCOUNTER — TELEPHONE (OUTPATIENT)
Age: 50
End: 2025-08-13
Payer: COMMERCIAL

## 2025-08-19 DIAGNOSIS — E55.9 VITAMIN D DEFICIENCY: ICD-10-CM

## 2025-08-19 RX ORDER — ERGOCALCIFEROL 1.25 MG/1
50000 CAPSULE ORAL
Qty: 7 CAPSULE | Refills: 0 | Status: SHIPPED | OUTPATIENT
Start: 2025-08-19